# Patient Record
Sex: MALE | Race: WHITE | Employment: FULL TIME | URBAN - METROPOLITAN AREA
[De-identification: names, ages, dates, MRNs, and addresses within clinical notes are randomized per-mention and may not be internally consistent; named-entity substitution may affect disease eponyms.]

---

## 2020-12-04 LAB
ALBUMIN: 4.3 G/DL (ref 3.4–5)
ALP BLD-CCNC: 85 U/L (ref 33–136)
ALT SERPL-CCNC: 27 U/L (ref 10–52)
ANION GAP SERPL CALCULATED.3IONS-SCNC: 18 MMOL/L (ref 10–20)
APPEARANCE: CLEAR
AST SERPL-CCNC: 42 U/L (ref 9–39)
BACTERIA, URINE: ABNORMAL /HPF
BASOPHILS # BLD: 0.04 X10E9/L (ref 0–0.1)
BASOPHILS RELATIVE PERCENT: 0.5 % (ref 0–2)
BICARBONATE: 19 MMOL/L (ref 21–32)
BILIRUB SERPL-MCNC: 0.7 MG/DL (ref 0–1.2)
BILIRUBIN, URINE: NEGATIVE
BLOOD, URINE: ABNORMAL
CALCIUM SERPL-MCNC: 9.3 MG/DL (ref 8.6–10.3)
CHLORIDE BLD-SCNC: 106 MMOL/L (ref 98–107)
COLOR, URINE: ABNORMAL
CREAT SERPL-MCNC: 1.31 MG/DL (ref 0.5–1.3)
EOSINOPHIL # BLD: 0.09 X10E9/L (ref 0–0.7)
EOSINOPHILS RELATIVE PERCENT: 1.2 % (ref 0–6)
ERYTHROCYTE [DISTWIDTH] IN BLOOD BY AUTOMATED COUNT: 13.7 % (ref 11.5–14)
GFR AFRICAN AMERICAN: 67 ML/MIN/1.73M2
GFR NON-AFRICAN AMERICAN: 55 ML/MIN/1.73M2
GLUCOSE, URINE: NEGATIVE MG/DL
GLUCOSE: 134 MG/DL (ref 74–99)
HCT VFR BLD CALC: 45.4 % (ref 41–52)
HEMOGLOBIN: 14.7 G/DL (ref 13.5–17)
HYALINE CASTS: ABNORMAL /LPF
IMMATURE GRANULOCYTES %: 0.1 % (ref 0–0.9)
KETONES, URINE: ABNORMAL MG/DL
LEUKOCYTE ESTERASE, URINE: NEGATIVE
LYMPHOCYTES # BLD: 45.3 % (ref 13–44)
LYMPHOCYTES RELATIVE PERCENT: 3.37 X10E9/L (ref 1.2–4.8)
MCHC RBC AUTO-ENTMCNC: 32.4 G/DL (ref 32–36)
MCV RBC AUTO: 88 FL (ref 80–100)
MONOCYTES # BLD: 0.59 X10E9/L (ref 0.1–1)
MONOCYTES RELATIVE PERCENT: 7.9 % (ref 2–10)
MUCUS, URINE: ABNORMAL /LPF
NEUTROPHILS RELATIVE PERCENT: 45 % (ref 40–80)
NEUTROPHILS: 3.34 X10E9/L (ref 1.2–7.7)
NITRITE, URINE: NEGATIVE
PH UA: 7 (ref 5–8)
PLATELET # BLD: 128 X10E9/L (ref 150–450)
POTASSIUM SERPL-SCNC: 4.2 MMOL/L (ref 3.5–5.3)
POTASSIUM SERPL-SCNC: 4.4 MMOL/L (ref 3.5–5.3)
PROTEIN UA: NEGATIVE MG/DL
RBC # BLD: 5.17 X10E12/L (ref 4.5–5.9)
RBC URINE: 4 /HPF (ref 0–5)
SODIUM BLD-SCNC: 139 MMOL/L (ref 136–145)
SPECIFIC GRAVITY, URINE: 1.01 (ref 1–1)
TOTAL PROTEIN: 7 G/DL (ref 6.4–8.2)
UREA NITROGEN: 15 MG/DL (ref 6–23)
UROBILINOGEN, URINE: <2 MG/DL (ref 0–1.9)
WBC URINE: <1 /HPF (ref 0–5)
WBC: 7.4 X10E9/L (ref 4.4–11.3)

## 2022-10-05 ENCOUNTER — ANESTHESIA (OUTPATIENT)
Dept: OPERATING ROOM | Age: 62
End: 2022-10-05
Payer: COMMERCIAL

## 2022-10-05 ENCOUNTER — APPOINTMENT (OUTPATIENT)
Dept: GENERAL RADIOLOGY | Age: 62
End: 2022-10-05
Attending: ORTHOPAEDIC SURGERY
Payer: COMMERCIAL

## 2022-10-05 ENCOUNTER — ANESTHESIA EVENT (OUTPATIENT)
Dept: OPERATING ROOM | Age: 62
End: 2022-10-05
Payer: COMMERCIAL

## 2022-10-05 ENCOUNTER — HOSPITAL ENCOUNTER (OUTPATIENT)
Age: 62
Setting detail: OUTPATIENT SURGERY
Discharge: HOME | End: 2022-10-05
Attending: ORTHOPAEDIC SURGERY | Admitting: ORTHOPAEDIC SURGERY
Payer: COMMERCIAL

## 2022-10-05 VITALS
OXYGEN SATURATION: 99 % | RESPIRATION RATE: 16 BRPM | SYSTOLIC BLOOD PRESSURE: 148 MMHG | HEART RATE: 66 BPM | TEMPERATURE: 97.5 F | DIASTOLIC BLOOD PRESSURE: 86 MMHG

## 2022-10-05 PROCEDURE — 2580000003 HC RX 258: Performed by: ORTHOPAEDIC SURGERY

## 2022-10-05 PROCEDURE — 6360000002 HC RX W HCPCS: Performed by: ORTHOPAEDIC SURGERY

## 2022-10-05 PROCEDURE — 2709999900 HC NON-CHARGEABLE SUPPLY: Performed by: ORTHOPAEDIC SURGERY

## 2022-10-05 PROCEDURE — 3600000002 HC SURGERY LEVEL 2 BASE: Performed by: ORTHOPAEDIC SURGERY

## 2022-10-05 PROCEDURE — 2500000003 HC RX 250 WO HCPCS: Performed by: ORTHOPAEDIC SURGERY

## 2022-10-05 PROCEDURE — 3700000000 HC ANESTHESIA ATTENDED CARE: Performed by: ORTHOPAEDIC SURGERY

## 2022-10-05 PROCEDURE — 2500000003 HC RX 250 WO HCPCS: Performed by: STUDENT IN AN ORGANIZED HEALTH CARE EDUCATION/TRAINING PROGRAM

## 2022-10-05 PROCEDURE — 3600000012 HC SURGERY LEVEL 2 ADDTL 15MIN: Performed by: ORTHOPAEDIC SURGERY

## 2022-10-05 PROCEDURE — 3209999900 FLUORO FOR SURGICAL PROCEDURES

## 2022-10-05 PROCEDURE — 3700000001 HC ADD 15 MINUTES (ANESTHESIA): Performed by: ORTHOPAEDIC SURGERY

## 2022-10-05 RX ORDER — ONDANSETRON 2 MG/ML
4 INJECTION INTRAMUSCULAR; INTRAVENOUS
Status: DISCONTINUED | OUTPATIENT
Start: 2022-10-05 | End: 2022-10-05 | Stop reason: HOSPADM

## 2022-10-05 RX ORDER — SODIUM CHLORIDE, SODIUM LACTATE, POTASSIUM CHLORIDE, CALCIUM CHLORIDE 600; 310; 30; 20 MG/100ML; MG/100ML; MG/100ML; MG/100ML
INJECTION, SOLUTION INTRAVENOUS CONTINUOUS
Status: DISCONTINUED | OUTPATIENT
Start: 2022-10-05 | End: 2022-10-05 | Stop reason: HOSPADM

## 2022-10-05 RX ORDER — METOPROLOL TARTRATE 100 MG/1
TABLET ORAL
COMMUNITY

## 2022-10-05 RX ORDER — MAGNESIUM HYDROXIDE 1200 MG/15ML
LIQUID ORAL CONTINUOUS PRN
Status: COMPLETED | OUTPATIENT
Start: 2022-10-05 | End: 2022-10-05

## 2022-10-05 RX ORDER — LIDOCAINE HYDROCHLORIDE AND EPINEPHRINE 10; 10 MG/ML; UG/ML
20 INJECTION, SOLUTION INFILTRATION; PERINEURAL
Status: COMPLETED | OUTPATIENT
Start: 2022-10-05 | End: 2022-10-05

## 2022-10-05 RX ORDER — AMLODIPINE BESYLATE 5 MG/1
TABLET ORAL
COMMUNITY

## 2022-10-05 RX ORDER — LIDOCAINE HYDROCHLORIDE 10 MG/ML
1 INJECTION, SOLUTION EPIDURAL; INFILTRATION; INTRACAUDAL; PERINEURAL
Status: COMPLETED | OUTPATIENT
Start: 2022-10-05 | End: 2022-10-05

## 2022-10-05 RX ORDER — LEVOTHYROXINE SODIUM 0.05 MG/1
TABLET ORAL
COMMUNITY

## 2022-10-05 RX ORDER — LIDOCAINE HYDROCHLORIDE 10 MG/ML
1 INJECTION, SOLUTION EPIDURAL; INFILTRATION; INTRACAUDAL; PERINEURAL ONCE
Status: DISCONTINUED | OUTPATIENT
Start: 2022-10-05 | End: 2022-10-05 | Stop reason: HOSPADM

## 2022-10-05 RX ADMIN — LIDOCAINE HYDROCHLORIDE 1 ML: 10 INJECTION, SOLUTION EPIDURAL; INFILTRATION; INTRACAUDAL; PERINEURAL at 08:06

## 2022-10-05 RX ADMIN — CEFAZOLIN 2000 MG: 10 INJECTION, POWDER, FOR SOLUTION INTRAVENOUS at 08:05

## 2022-10-05 RX ADMIN — SODIUM BICARBONATE 50 MEQ: 84 INJECTION, SOLUTION INTRAVENOUS at 08:04

## 2022-10-05 RX ADMIN — SODIUM CHLORIDE, POTASSIUM CHLORIDE, SODIUM LACTATE AND CALCIUM CHLORIDE: 600; 310; 30; 20 INJECTION, SOLUTION INTRAVENOUS at 08:01

## 2022-10-05 RX ADMIN — LIDOCAINE HYDROCHLORIDE,EPINEPHRINE BITARTRATE 20 ML: 10; .01 INJECTION, SOLUTION INFILTRATION; PERINEURAL at 08:02

## 2022-10-05 ASSESSMENT — PAIN SCALES - GENERAL: PAINLEVEL_OUTOF10: 0

## 2022-10-05 NOTE — ANESTHESIA PRE PROCEDURE
Department of Anesthesiology  Preprocedure Note       Name:  Thomas Darling   Age:  57 y.o.  :  1960                                          MRN:  [id number]         Date:  10/5/2022      Surgeon: Kirill Hernandez):  Andrade Thomas DO    Procedure: Procedure(s):  LEFT RING FINGER REVISION OF AMPUTATION WITH POSSIBLE FULL THICKNESS SKIN GRAFT, SUPINE, WIDE AWAKE BLOCK, DIGITAL BLOCK. LOCAL/ MAC,    Medications prior to admission:   Prior to Admission medications    Medication Sig Start Date End Date Taking? Authorizing Provider   amLODIPine (NORVASC) 5 MG tablet Take by mouth    Historical Provider, MD   levothyroxine (SYNTHROID) 50 MCG tablet Take by mouth    Historical Provider, MD   metoprolol (LOPRESSOR) 100 MG tablet Take by mouth    Historical Provider, MD       Current medications:    Current Facility-Administered Medications   Medication Dose Route Frequency Provider Last Rate Last Admin    ceFAZolin (ANCEF) 2000 mg in dextrose 5 % 100 mL IVPB  2,000 mg IntraVENous Once Alexey MONICA Ciaccia, DO        lactated ringers infusion   IntraVENous Continuous Alexey Dysonia, DO        lidocaine PF 1 % injection 1 mL  1 mL IntraDERmal Once Alexey MONICA Ciaccia, DO        lidocaine-EPINEPHrine 1 %-1:269924 injection 20 mL  20 mL IntraDERmal Once PRN Alexey M Ciaccia, DO        sodium bicarbonate 8.4 % injection 50 mEq  50 mEq IntraVENous Once PRN Alexey MONICA Ciaccia, DO           Allergies: Allergies   Allergen Reactions    Valproic Acid Other (See Comments)       Problem List:  There is no problem list on file for this patient. Past Medical History:  No past medical history on file. Past Surgical History:  No past surgical history on file.     Social History:    Social History     Tobacco Use    Smoking status: Not on file    Smokeless tobacco: Not on file   Substance Use Topics    Alcohol use: Not on file                                Counseling given: Not Answered      Vital Signs (Current):   Vitals: 10/05/22 0745   BP: (!) 148/80   Pulse: 69   Temp: (!) 96.1 °F (35.6 °C)   TempSrc: Temporal   SpO2: 96%                                              BP Readings from Last 3 Encounters:   10/05/22 (!) 148/80       NPO Status: Time of last liquid consumption: 2200                        Time of last solid consumption: 2100                        Date of last liquid consumption: 10/04/22                        Date of last solid food consumption: 10/04/22    BMI:   Wt Readings from Last 3 Encounters:   No data found for Wt     There is no height or weight on file to calculate BMI.    CBC:   Lab Results   Component Value Date/Time    WBC 7.4 12/04/2020 08:19 AM    RBC 5.17 12/04/2020 08:19 AM    HGB 14.7 12/04/2020 08:19 AM    HCT 45.4 12/04/2020 08:19 AM    MCV 88 12/04/2020 08:19 AM     12/04/2020 08:19 AM       CMP:   Lab Results   Component Value Date/Time     12/04/2020 08:19 AM    K 4.2 12/04/2020 10:05 AM     12/04/2020 08:19 AM    CREATININE 1.31 12/04/2020 08:19 AM    GFRAA 67 12/04/2020 08:19 AM    LABGLOM 55 12/04/2020 08:19 AM    GLUCOSE 134 12/04/2020 08:19 AM    PROT 7.0 12/04/2020 08:19 AM    CALCIUM 9.3 12/04/2020 08:19 AM    BILITOT 0.7 12/04/2020 08:19 AM    ALKPHOS 85 12/04/2020 08:19 AM    AST 42 12/04/2020 08:19 AM    ALT 27 12/04/2020 08:19 AM       POC Tests: No results for input(s): POCGLU, POCNA, POCK, POCCL, POCBUN, POCHEMO, POCHCT in the last 72 hours.     Coags: No results found for: PROTIME, INR, APTT    HCG (If Applicable): No results found for: PREGTESTUR, PREGSERUM, HCG, HCGQUANT     ABGs: No results found for: PHART, PO2ART, POV2ESD, UYX4AAW, BEART, D0HGNXHD     Type & Screen (If Applicable):  No results found for: LABABO, LABRH    Drug/Infectious Status (If Applicable):  No results found for: HIV, HEPCAB    COVID-19 Screening (If Applicable): No results found for: COVID19        Anesthesia Evaluation  Patient summary reviewed and Nursing notes reviewed no history of anesthetic complications:   Airway: Mallampati: II  TM distance: >3 FB   Neck ROM: full  Mouth opening: > = 3 FB   Dental: normal exam         Pulmonary:Negative Pulmonary ROS and normal exam                               Cardiovascular:  Exercise tolerance: good (>4 METS),   (+) hypertension:,          Beta Blocker:  Not on Beta Blocker         Neuro/Psych:   Negative Neuro/Psych ROS              GI/Hepatic/Renal: Neg GI/Hepatic/Renal ROS            Endo/Other:    (+) hypothyroidism::., .          Pt had PAT visit. Abdominal:             Vascular: negative vascular ROS. Other Findings:           Anesthesia Plan      MAC     ASA 2       Induction: intravenous. Anesthetic plan and risks discussed with patient. Plan discussed with CRNA.     Attending anesthesiologist reviewed and agrees with Preprocedure content                [facility],    10/5/2022

## 2022-10-05 NOTE — OP NOTE
Operative Note      Patient: Stewart Maxwell  YOB: 1960  MRN: [id number]    Date of Procedure: 10/5/2022        Preoperative diagnosis: Open fracture left ring finger distal phalanx with nailbed laceration and dorsal soft tissue loss at the tip of the digit    Postoperative diagnosis: Same     Procedure planned: Excisional debridement to open fracture of left ring finger with possible full-thickness skin grafting with possible revision of amputation    Procedure performed: Excisional debridement to open fracture of left ring finger distal phalanx. Open treatment left ring finger distal phalanx fracture. Nailbed repair left ring finger using local tissue rearrangement with rotational flap of remaining nailbed tissue. Surgeon: Oralia Strickland D.O. Assistant: None    Anesthesia: Digital block monitored by the anesthesia team    Estimated blood loss: Less than 10 cc    Drains: None    Tourniquet: Turnicot for approximately 30 minutes    Specimens: None    Implants: None    Indications: The patient sustained complex injury to the left ring finger in the workplace. His finger was injured with a . There was extensive soft tissue loss across the dorsal distal aspect of the left ring finger. There was evidence of open fracture as well. Treatment options were discussed including operative and nonoperative strategies. Recommendations made for excisional debridement with possible revision of amputation with possible full-thickness skin grafting with possible primary closure. Informed consent was signed and placed in the chart. Complications: None noted at the time of surgery     Description of operation: The patient was taken to the operative suite and placed in the supine position on the operating table. A timeout was performed and the left ring finger confirmed to be the operative site.   The patient was carefully positioned on the table in such a fashion as to pad all bony prominences and peripheral nerves administered appropriate IV antibiotics. The digital block was working well. He was prepped and draped in normal sterile fashion. A turnicot was placed to the base of the left ring finger. The wound was inspected. There was full-thickness nailbed tissue loss about the midline of the digit exposing approximately one third of the dorsal aspect of the distal phalanx. The distal phalanx showed evidence for open fracture both at the tuft along with a split about the distal ulnar aspect of the shaft segment. It was felt that there was enough soft tissue available to perform primary closure. The open fracture was debrided removing elements of bony particulate with rongeur and curette tenotomy scissors were used to debride nonviable skin edges and subcutaneous tissue along with nonviable nailbed tissue. 3 L of normal sterile saline were then passed through the wound. In order to achieve closure of the open fracture was treated by way of removal of the bony elements that effectively decrease the transverse dimension of the digit. This allowed the more ulnar soft tissues to be advanced to approximate the remaining nailbed. This could not be done however without rotational flap involving the nailbed. The 15 blade was used to elevate the nailbed off of the midline and radial aspects of its attachment. This was then rotated ulnarly to allow for good approximation to the ulnar soft tissues and excellent coverage of the remaining bone stock of the distal phalanx. This rotational flap and nailbed repair was secured with chromic suture. The skin laceration was repaired with chromic suture and nylon. Additional irrigation was performed. The turnicot was relieved and viability and hemostasis confirmed. Soft dressing was placed and the patient was allowed to head to recovery in stable condition. Overall he tolerated the procedure well.      Disposition: Stable to PACU     Electronically signed by Allyson Riley DO on 10/5/2022 at 10:08 AM

## 2023-03-13 PROBLEM — Z85.9 HISTORY OF CANCER: Status: ACTIVE | Noted: 2023-03-13

## 2023-03-13 PROBLEM — R79.9 ABNORMAL BLOOD CHEMISTRY: Status: ACTIVE | Noted: 2023-03-13

## 2023-03-13 PROBLEM — R93.1 ELEVATED CORONARY ARTERY CALCIUM SCORE: Status: ACTIVE | Noted: 2023-03-13

## 2023-03-13 PROBLEM — R10.2 PELVIC PAIN IN MALE: Status: ACTIVE | Noted: 2023-03-13

## 2023-03-13 PROBLEM — M13.0 POLYARTHRITIS: Status: ACTIVE | Noted: 2023-03-13

## 2023-03-13 PROBLEM — S69.90XA FINGER INJURY: Status: ACTIVE | Noted: 2023-03-13

## 2023-03-13 PROBLEM — M17.9 OSTEOARTHROSIS OF KNEE: Status: ACTIVE | Noted: 2023-03-13

## 2023-03-13 PROBLEM — K21.00 GASTROESOPHAGEAL REFLUX DISEASE WITH ESOPHAGITIS: Status: ACTIVE | Noted: 2023-03-13

## 2023-03-13 PROBLEM — I83.92 VARICOSE VEINS OF LEFT LOWER EXTREMITY: Status: ACTIVE | Noted: 2023-03-13

## 2023-03-13 PROBLEM — M54.9 BACK PAIN: Status: ACTIVE | Noted: 2023-03-13

## 2023-03-13 PROBLEM — K57.32 DIVERTICULITIS OF COLON: Status: ACTIVE | Noted: 2023-03-13

## 2023-03-13 PROBLEM — R35.89 POLYURIA: Status: ACTIVE | Noted: 2023-03-13

## 2023-03-13 PROBLEM — M54.16 LUMBAR RADICULOPATHY: Status: ACTIVE | Noted: 2023-03-13

## 2023-03-13 PROBLEM — G62.9 NEUROPATHY: Status: ACTIVE | Noted: 2023-03-13

## 2023-03-13 PROBLEM — J02.9 SORE THROAT: Status: ACTIVE | Noted: 2023-03-13

## 2023-03-13 PROBLEM — E03.9 HYPOTHYROIDISM: Status: ACTIVE | Noted: 2023-03-13

## 2023-03-13 PROBLEM — R79.89 ELEVATED TSH: Status: ACTIVE | Noted: 2023-03-13

## 2023-03-13 PROBLEM — G43.909 MIGRAINE, UNSPECIFIED, NOT INTRACTABLE, WITHOUT STATUS MIGRAINOSUS: Status: ACTIVE | Noted: 2023-03-13

## 2023-03-13 PROBLEM — I10 HYPERTENSION: Status: ACTIVE | Noted: 2023-03-13

## 2023-03-13 PROBLEM — E78.5 HYPERLIPIDEMIA: Status: ACTIVE | Noted: 2023-03-13

## 2023-03-13 PROBLEM — M99.9 NONALLOPATHIC LESION OF HIP REGION: Status: ACTIVE | Noted: 2023-03-13

## 2023-03-13 RX ORDER — HYDROCODONE BITARTRATE AND ACETAMINOPHEN 5; 325 MG/1; MG/1
1 TABLET ORAL EVERY 8 HOURS PRN
COMMUNITY
Start: 2022-09-30 | End: 2023-03-16 | Stop reason: ALTCHOICE

## 2023-03-13 RX ORDER — LEVOTHYROXINE SODIUM 50 UG/1
1 TABLET ORAL DAILY
COMMUNITY
Start: 2019-04-16 | End: 2023-03-16 | Stop reason: SDUPTHER

## 2023-03-13 RX ORDER — METOPROLOL TARTRATE 100 MG/1
1 TABLET ORAL DAILY
COMMUNITY
Start: 2018-09-17 | End: 2023-03-16 | Stop reason: SDUPTHER

## 2023-03-13 RX ORDER — AMLODIPINE BESYLATE 5 MG/1
1 TABLET ORAL DAILY
COMMUNITY
Start: 2018-08-30 | End: 2023-03-16 | Stop reason: SDUPTHER

## 2023-03-16 ENCOUNTER — OFFICE VISIT (OUTPATIENT)
Dept: PRIMARY CARE | Facility: CLINIC | Age: 63
End: 2023-03-16
Payer: COMMERCIAL

## 2023-03-16 ENCOUNTER — LAB (OUTPATIENT)
Dept: LAB | Facility: LAB | Age: 63
End: 2023-03-16
Payer: COMMERCIAL

## 2023-03-16 VITALS
HEIGHT: 70 IN | DIASTOLIC BLOOD PRESSURE: 94 MMHG | WEIGHT: 245 LBS | BODY MASS INDEX: 35.07 KG/M2 | SYSTOLIC BLOOD PRESSURE: 138 MMHG

## 2023-03-16 DIAGNOSIS — E03.8 HYPOTHYROIDISM DUE TO HASHIMOTO'S THYROIDITIS: ICD-10-CM

## 2023-03-16 DIAGNOSIS — E06.3 HYPOTHYROIDISM DUE TO HASHIMOTO'S THYROIDITIS: ICD-10-CM

## 2023-03-16 DIAGNOSIS — I10 PRIMARY HYPERTENSION: ICD-10-CM

## 2023-03-16 DIAGNOSIS — I10 PRIMARY HYPERTENSION: Primary | ICD-10-CM

## 2023-03-16 LAB
ALANINE AMINOTRANSFERASE (SGPT) (U/L) IN SER/PLAS: 24 U/L (ref 10–52)
ALBUMIN (G/DL) IN SER/PLAS: 4.2 G/DL (ref 3.4–5)
ALKALINE PHOSPHATASE (U/L) IN SER/PLAS: 102 U/L (ref 33–136)
ANION GAP IN SER/PLAS: 15 MMOL/L (ref 10–20)
ASPARTATE AMINOTRANSFERASE (SGOT) (U/L) IN SER/PLAS: 25 U/L (ref 9–39)
BILIRUBIN TOTAL (MG/DL) IN SER/PLAS: 0.5 MG/DL (ref 0–1.2)
CALCIUM (MG/DL) IN SER/PLAS: 9.4 MG/DL (ref 8.6–10.3)
CARBON DIOXIDE, TOTAL (MMOL/L) IN SER/PLAS: 26 MMOL/L (ref 21–32)
CHLORIDE (MMOL/L) IN SER/PLAS: 108 MMOL/L (ref 98–107)
CHOLESTEROL (MG/DL) IN SER/PLAS: 148 MG/DL (ref 0–199)
CHOLESTEROL IN HDL (MG/DL) IN SER/PLAS: 44.4 MG/DL
CHOLESTEROL/HDL RATIO: 3.3
CREATININE (MG/DL) IN SER/PLAS: 1.29 MG/DL (ref 0.5–1.3)
GFR MALE: 62 ML/MIN/1.73M2
GLUCOSE (MG/DL) IN SER/PLAS: 91 MG/DL (ref 74–99)
LDL: 74 MG/DL (ref 0–99)
POTASSIUM (MMOL/L) IN SER/PLAS: 5 MMOL/L (ref 3.5–5.3)
PROTEIN TOTAL: 7 G/DL (ref 6.4–8.2)
SODIUM (MMOL/L) IN SER/PLAS: 144 MMOL/L (ref 136–145)
THYROTROPIN (MIU/L) IN SER/PLAS BY DETECTION LIMIT <= 0.05 MIU/L: 2.47 MIU/L (ref 0.44–3.98)
TRIGLYCERIDE (MG/DL) IN SER/PLAS: 149 MG/DL (ref 0–149)
UREA NITROGEN (MG/DL) IN SER/PLAS: 28 MG/DL (ref 6–23)
VLDL: 30 MG/DL (ref 0–40)

## 2023-03-16 PROCEDURE — 80053 COMPREHEN METABOLIC PANEL: CPT

## 2023-03-16 PROCEDURE — 3075F SYST BP GE 130 - 139MM HG: CPT | Performed by: FAMILY MEDICINE

## 2023-03-16 PROCEDURE — 84443 ASSAY THYROID STIM HORMONE: CPT

## 2023-03-16 PROCEDURE — 3080F DIAST BP >= 90 MM HG: CPT | Performed by: FAMILY MEDICINE

## 2023-03-16 PROCEDURE — 80061 LIPID PANEL: CPT

## 2023-03-16 PROCEDURE — 99213 OFFICE O/P EST LOW 20 MIN: CPT | Performed by: FAMILY MEDICINE

## 2023-03-16 PROCEDURE — 36415 COLL VENOUS BLD VENIPUNCTURE: CPT

## 2023-03-16 RX ORDER — AMLODIPINE BESYLATE 5 MG/1
5 TABLET ORAL DAILY
Qty: 90 TABLET | Refills: 3 | Status: SHIPPED | OUTPATIENT
Start: 2023-03-16 | End: 2023-03-23 | Stop reason: SDUPTHER

## 2023-03-16 RX ORDER — LEVOTHYROXINE SODIUM 50 UG/1
50 TABLET ORAL DAILY
Qty: 90 TABLET | Refills: 3 | Status: SHIPPED | OUTPATIENT
Start: 2023-03-16 | End: 2023-03-23 | Stop reason: SDUPTHER

## 2023-03-16 RX ORDER — METOPROLOL TARTRATE 100 MG/1
100 TABLET ORAL DAILY
Qty: 90 TABLET | Refills: 3 | Status: SHIPPED | OUTPATIENT
Start: 2023-03-16 | End: 2023-03-23 | Stop reason: SDUPTHER

## 2023-03-16 ASSESSMENT — ENCOUNTER SYMPTOMS
ALLERGIC/IMMUNOLOGIC NEGATIVE: 1
EYES NEGATIVE: 1
ENDOCRINE NEGATIVE: 1
PALPITATIONS: 0
NECK PAIN: 0
HEADACHES: 0
NEUROLOGICAL NEGATIVE: 1
RESPIRATORY NEGATIVE: 1
CONSTITUTIONAL NEGATIVE: 1
PND: 0
HEMATOLOGIC/LYMPHATIC NEGATIVE: 1
ORTHOPNEA: 0
PSYCHIATRIC NEGATIVE: 1
CARDIOVASCULAR NEGATIVE: 1
SWEATS: 0
ARTHRALGIAS: 1
BLURRED VISION: 0
HYPERTENSION: 1
GASTROINTESTINAL NEGATIVE: 1
SHORTNESS OF BREATH: 0

## 2023-03-16 NOTE — PATIENT INSTRUCTIONS
Rx . Labs contd meds , diet , tcb x 1wk , had all shots , FUWOD's start taking amlodopine ,  monitor BP daily ,

## 2023-03-16 NOTE — PROGRESS NOTES
"Subjective   Patient ID: Derick Barone is a 62 y.o. male who presents for Follow-up (Patient presented today for medication management. ).    Hypertension  This is a chronic problem. The problem is unchanged. Pertinent negatives include no anxiety, blurred vision, chest pain, headaches, malaise/fatigue, neck pain, orthopnea, palpitations, peripheral edema, PND, shortness of breath or sweats. There are no associated agents to hypertension. Risk factors for coronary artery disease include male gender and family history. Past treatments include calcium channel blockers. Compliance problems include exercise and diet.  There is no history of angina, kidney disease, CAD/MI, CVA, heart failure, left ventricular hypertrophy, PVD or retinopathy. There is no history of chronic renal disease, coarctation of the aorta, hyperaldosteronism, hypercortisolism, hyperparathyroidism, a hypertension causing med, pheochromocytoma, renovascular disease, sleep apnea or a thyroid problem.        Review of Systems   Constitutional: Negative.  Negative for malaise/fatigue.   HENT: Negative.     Eyes: Negative.  Negative for blurred vision.   Respiratory: Negative.  Negative for shortness of breath.    Cardiovascular: Negative.  Negative for chest pain, palpitations, orthopnea and PND.   Gastrointestinal: Negative.    Endocrine: Negative.    Genitourinary: Negative.    Musculoskeletal:  Positive for arthralgias. Negative for neck pain.   Skin: Negative.    Allergic/Immunologic: Negative.    Neurological: Negative.  Negative for headaches.   Hematological: Negative.    Psychiatric/Behavioral: Negative.         Objective   BP (!) 138/94   Ht 1.778 m (5' 10\")   Wt 111 kg (245 lb)   BMI 35.15 kg/m²     Physical Exam  Constitutional:       Appearance: Normal appearance.   HENT:      Head: Normocephalic and atraumatic.      Nose: Nose normal.      Mouth/Throat:      Mouth: Mucous membranes are moist.   Eyes:      Extraocular Movements: " Extraocular movements intact.      Pupils: Pupils are equal, round, and reactive to light.   Cardiovascular:      Rate and Rhythm: Normal rate and regular rhythm.   Pulmonary:      Effort: Pulmonary effort is normal.   Musculoskeletal:         General: Deformity: Lt 4/5 finger tips.      Cervical back: Normal range of motion.   Skin:     General: Skin is warm.   Neurological:      General: No focal deficit present.      Mental Status: He is alert and oriented to person, place, and time.   Psychiatric:         Mood and Affect: Mood normal.         Behavior: Behavior normal.         Assessment/Plan

## 2023-03-23 DIAGNOSIS — E06.3 HYPOTHYROIDISM DUE TO HASHIMOTO'S THYROIDITIS: ICD-10-CM

## 2023-03-23 DIAGNOSIS — E03.8 HYPOTHYROIDISM DUE TO HASHIMOTO'S THYROIDITIS: ICD-10-CM

## 2023-03-23 DIAGNOSIS — I10 PRIMARY HYPERTENSION: ICD-10-CM

## 2023-03-23 RX ORDER — LEVOTHYROXINE SODIUM 50 UG/1
50 TABLET ORAL DAILY
Qty: 90 TABLET | Refills: 3 | Status: SHIPPED | OUTPATIENT
Start: 2023-03-23 | End: 2023-12-11 | Stop reason: SDUPTHER

## 2023-03-23 RX ORDER — AMLODIPINE BESYLATE 5 MG/1
5 TABLET ORAL DAILY
Qty: 90 TABLET | Refills: 3 | Status: SHIPPED | OUTPATIENT
Start: 2023-03-23 | End: 2023-12-11 | Stop reason: WASHOUT

## 2023-03-23 RX ORDER — METOPROLOL TARTRATE 100 MG/1
100 TABLET ORAL DAILY
Qty: 90 TABLET | Refills: 3 | Status: SHIPPED | OUTPATIENT
Start: 2023-03-23 | End: 2023-05-24 | Stop reason: WASHOUT

## 2023-04-21 ENCOUNTER — TELEPHONE (OUTPATIENT)
Dept: PRIMARY CARE | Facility: CLINIC | Age: 63
End: 2023-04-21

## 2023-04-21 ENCOUNTER — OFFICE VISIT (OUTPATIENT)
Dept: PRIMARY CARE | Facility: CLINIC | Age: 63
End: 2023-04-21
Payer: COMMERCIAL

## 2023-04-21 VITALS
OXYGEN SATURATION: 96 % | DIASTOLIC BLOOD PRESSURE: 80 MMHG | WEIGHT: 249.4 LBS | SYSTOLIC BLOOD PRESSURE: 130 MMHG | HEART RATE: 75 BPM | HEIGHT: 70 IN | BODY MASS INDEX: 35.71 KG/M2

## 2023-04-21 DIAGNOSIS — M46.28 SACRAL OSTEOMYELITIS (MULTI): ICD-10-CM

## 2023-04-21 DIAGNOSIS — M54.10 NERVE ROOT INFLAMMATION: ICD-10-CM

## 2023-04-21 DIAGNOSIS — T14.8XXA MUSCLE STRAIN: ICD-10-CM

## 2023-04-21 DIAGNOSIS — Z76.89 ENCOUNTER FOR SUPPORT AND COORDINATION OF TRANSITION OF CARE: Primary | ICD-10-CM

## 2023-04-21 PROCEDURE — 3075F SYST BP GE 130 - 139MM HG: CPT | Performed by: EMERGENCY MEDICINE

## 2023-04-21 PROCEDURE — 3079F DIAST BP 80-89 MM HG: CPT | Performed by: EMERGENCY MEDICINE

## 2023-04-21 PROCEDURE — 99496 TRANSJ CARE MGMT HIGH F2F 7D: CPT | Performed by: EMERGENCY MEDICINE

## 2023-04-21 PROCEDURE — 1036F TOBACCO NON-USER: CPT | Performed by: EMERGENCY MEDICINE

## 2023-04-21 RX ORDER — CEFAZOLIN 2 G/1
2 INJECTION, POWDER, FOR SOLUTION INTRAMUSCULAR; INTRAVENOUS
COMMUNITY
Start: 2023-04-17 | End: 2023-05-24 | Stop reason: WASHOUT

## 2023-04-21 RX ORDER — CYCLOBENZAPRINE HCL 10 MG
10 TABLET ORAL NIGHTLY PRN
Qty: 7 TABLET | Refills: 0 | Status: SHIPPED | OUTPATIENT
Start: 2023-04-21 | End: 2023-05-24 | Stop reason: WASHOUT

## 2023-04-21 RX ORDER — PREDNISONE 20 MG/1
20 TABLET ORAL DAILY
Qty: 10 TABLET | Refills: 0 | Status: SHIPPED | OUTPATIENT
Start: 2023-04-21 | End: 2023-04-24 | Stop reason: SDUPTHER

## 2023-04-21 ASSESSMENT — PATIENT HEALTH QUESTIONNAIRE - PHQ9
2. FEELING DOWN, DEPRESSED OR HOPELESS: NOT AT ALL
SUM OF ALL RESPONSES TO PHQ9 QUESTIONS 1 AND 2: 0
1. LITTLE INTEREST OR PLEASURE IN DOING THINGS: NOT AT ALL

## 2023-04-21 NOTE — TELEPHONE ENCOUNTER
Prednisone was called in with two different directions. After his visit    Directions state:     Take 1 tablet (20 mg) by mouth once daily for 5 days. TWO PILLS DAILY    Do you want him to take 2 pills daily or 1 pill daily?

## 2023-04-21 NOTE — PROGRESS NOTES
Subjective   Patient ID: Derick Barone is a 62 y.o. male who presents for Follow-up (Follow up from . Was admitted for 7 days for sepsis. ).    Assessment/Plan   Problem List Items Addressed This Visit    None    62-year-old for transition of care    Iliopsoas infection with possible sacral osteomyelitis-patient has a PICC line and is on IV cefazolin.   will follow-up with ID    Joint pains and other aches-I will treat him with a short course of prednisone and Flexeril    Hypertension-continue metoprolol and amlodipine    Hypothyroidism-continue Synthroid    Patient will follow-up in 1 to 2 months after completing IV antibiotics for a physical and for us to address his long-term health issues    Source of history: Nurse, Medical personnel, Medical record, Patient.  History limitation: None.    HPI  Patient was discharged from [facility] on April 17 and there was interactive contact by patient/family or facility staff on behalf of patient with me/office within 2 working days regarding patient's transition     Patient was recently admitted to the hospital.  Patient's history regarding the reason for hospital admission and the course in the hospital was reviewed with patinet and/or family.  Patient's medical records including lab work, radiology and other medical notes were reviewed.  His medication list prior to admission and discharge medication list are compared and updated.    Patient encounter was done face-to-face  Patient is unable to give detailed history and therefore history is obtained from the chart  History from hospitalization-  62-year-old admitted with groin pain  Questionable iliopsoas abscess  Later on thought to be a phlegmon treated with IV antibiotics  Bone scan showed questionable uptake in sacrum suggestive of infection  Orthopedics and infectious disease consultations were obtained  ID recommended cefazolin for 5 weeks which patient is currently taking via PICC  "line    Currently states that he still has a lot of pain all over.  Prednisone on the last day helped him in the hospital  Allergies   Allergen Reactions    Divalproex Other    Valproic Acid Unknown       Current Outpatient Medications   Medication Sig Dispense Refill    amLODIPine (Norvasc) 5 mg tablet Take 1 tablet (5 mg) by mouth once daily. (Patient taking differently: Take 2 tablets (10 mg) by mouth once daily.) 90 tablet 3    ceFAZolin 2 gram recon soln 2 g. Every 8 hrs      levothyroxine (Synthroid, Levoxyl) 50 mcg tablet Take 1 tablet (50 mcg) by mouth once daily. 90 tablet 3    metoprolol tartrate (Lopressor) 100 mg tablet Take 1 tablet (100 mg) by mouth once daily. 90 tablet 3     No current facility-administered medications for this visit.       Objective   Visit Vitals  /80   Pulse 75   Ht 1.778 m (5' 10\")   Wt 113 kg (249 lb 6.4 oz)   SpO2 96%   BMI 35.79 kg/m²   Smoking Status Former   BSA 2.36 m²     Physical Exam  Vital signs as per nursing/MA documentation  General appearance: Alert and in no acute distress  HEENT: Normal Inspection  Neck - Normal Inspection  Respiratory : No respiratory distress. Lungs are clear   Cardiovascular: heart rate normal. No gallop  Back - normal inspection  Skin inspection:Warm  Musculoskeletal : No deformities  Neuro : Limited exam. Baseline    Review of Systems   Comprehensive review of systems as allowed by patient condition and nursing input is negative    Results including lab work, imaging reports were reviewed and wherever possible, independently verified    Family History   Problem Relation Name Age of Onset    Diabetes Mother      Coronary artery disease Father      Leukemia Sister      Diabetes Sister      Epilepsy Daughter      Seizures Daughter      Colon cancer Mother's Brother      Pancreatic cancer Mother's Brother      Throat cancer Mother's Brother      Coronary artery disease Mother's Brother       Social History     Socioeconomic History    " Marital status:      Spouse name: None    Number of children: None    Years of education: None    Highest education level: None   Occupational History    None   Tobacco Use    Smoking status: Former     Types: Cigarettes    Smokeless tobacco: Never   Vaping Use    Vaping status: None   Substance and Sexual Activity    Alcohol use: Never    Drug use: None    Sexual activity: None   Other Topics Concern    None   Social History Narrative    None     Social Determinants of Health     Financial Resource Strain: Not on file   Food Insecurity: Not on file   Transportation Needs: Not on file   Physical Activity: Not on file   Stress: Not on file   Social Connections: Not on file   Intimate Partner Violence: Not on file   Housing Stability: Not on file     Past Medical History:   Diagnosis Date    Acute ethmoidal sinusitis, unspecified     Acute ethmoidal sinusitis     Past Surgical History:   Procedure Laterality Date    OTHER SURGICAL HISTORY  03/20/2019    Inguinal hernia repair    OTHER SURGICAL HISTORY  12/21/2020    Knee replacement       Charting was completed using voice recognition technology and may include unintended errors.

## 2023-04-24 DIAGNOSIS — M54.10 NERVE ROOT INFLAMMATION: ICD-10-CM

## 2023-04-24 RX ORDER — PREDNISONE 20 MG/1
TABLET ORAL
Qty: 10 TABLET | Refills: 0 | Status: SHIPPED | OUTPATIENT
Start: 2023-04-24 | End: 2023-04-24 | Stop reason: SDUPTHER

## 2023-04-24 RX ORDER — PREDNISONE 20 MG/1
TABLET ORAL
Qty: 10 TABLET | Refills: 0 | Status: SHIPPED | OUTPATIENT
Start: 2023-04-24 | End: 2023-05-24 | Stop reason: WASHOUT

## 2023-05-03 ENCOUNTER — OFFICE VISIT (OUTPATIENT)
Dept: PRIMARY CARE | Facility: CLINIC | Age: 63
End: 2023-05-03
Payer: COMMERCIAL

## 2023-05-03 VITALS — BODY MASS INDEX: 35.3 KG/M2 | SYSTOLIC BLOOD PRESSURE: 144 MMHG | DIASTOLIC BLOOD PRESSURE: 86 MMHG | WEIGHT: 246 LBS

## 2023-05-03 DIAGNOSIS — E06.3 HYPOTHYROIDISM DUE TO HASHIMOTO'S THYROIDITIS: ICD-10-CM

## 2023-05-03 DIAGNOSIS — E66.01 CLASS 2 SEVERE OBESITY DUE TO EXCESS CALORIES WITH SERIOUS COMORBIDITY IN ADULT, UNSPECIFIED BMI (MULTI): ICD-10-CM

## 2023-05-03 DIAGNOSIS — I10 PRIMARY HYPERTENSION: ICD-10-CM

## 2023-05-03 DIAGNOSIS — M54.16 LUMBAR RADICULOPATHY: Primary | ICD-10-CM

## 2023-05-03 DIAGNOSIS — M25.562 CHRONIC PAIN OF LEFT KNEE: ICD-10-CM

## 2023-05-03 DIAGNOSIS — M17.32 POST-TRAUMATIC OSTEOARTHRITIS OF LEFT KNEE: ICD-10-CM

## 2023-05-03 DIAGNOSIS — N20.0 NEPHROLITHIASIS: ICD-10-CM

## 2023-05-03 DIAGNOSIS — G89.29 CHRONIC PAIN OF LEFT KNEE: ICD-10-CM

## 2023-05-03 DIAGNOSIS — E03.8 HYPOTHYROIDISM DUE TO HASHIMOTO'S THYROIDITIS: ICD-10-CM

## 2023-05-03 DIAGNOSIS — K21.00 GASTROESOPHAGEAL REFLUX DISEASE WITH ESOPHAGITIS WITHOUT HEMORRHAGE: ICD-10-CM

## 2023-05-03 PROBLEM — M25.552 CHRONIC LEFT HIP PAIN: Status: ACTIVE | Noted: 2023-04-12

## 2023-05-03 PROBLEM — N13.30 HYDRONEPHROSIS OF RIGHT KIDNEY: Status: ACTIVE | Noted: 2020-12-15

## 2023-05-03 PROBLEM — N20.1 URETERAL CALCULUS: Status: ACTIVE | Noted: 2020-12-15

## 2023-05-03 PROBLEM — M16.12 OSTEOARTHRITIS OF LEFT HIP: Status: ACTIVE | Noted: 2023-04-12

## 2023-05-03 PROBLEM — E87.6 ACUTE HYPOKALEMIA: Status: ACTIVE | Noted: 2023-04-12

## 2023-05-03 PROBLEM — M25.462 EFFUSION OF KNEE JOINT, LEFT: Status: ACTIVE | Noted: 2023-04-12

## 2023-05-03 PROBLEM — N20.9 URIC ACID UROLITHIASIS: Status: ACTIVE | Noted: 2022-06-20

## 2023-05-03 PROBLEM — N31.8 FREQUENCY-URGENCY SYNDROME: Status: ACTIVE | Noted: 2020-12-15

## 2023-05-03 PROBLEM — I83.10 VARICOSE VEINS OF LOWER EXTREMITY WITH INFLAMMATION: Status: ACTIVE | Noted: 2023-05-03

## 2023-05-03 PROBLEM — R10.9 RIGHT FLANK PAIN: Status: ACTIVE | Noted: 2020-12-15

## 2023-05-03 PROBLEM — R26.89 INABILITY TO BEAR WEIGHT: Status: ACTIVE | Noted: 2023-04-12

## 2023-05-03 PROBLEM — M62.838 CERVICAL PARASPINAL MUSCLE SPASM: Status: ACTIVE | Noted: 2023-04-12

## 2023-05-03 PROBLEM — E66.9 OBESITY, CLASS II, BMI 35-39.9: Status: ACTIVE | Noted: 2022-05-13

## 2023-05-03 PROBLEM — K21.9 GASTROESOPHAGEAL REFLUX DISEASE: Status: ACTIVE | Noted: 2022-06-20

## 2023-05-03 PROBLEM — R42 DIZZINESS: Status: ACTIVE | Noted: 2022-05-13

## 2023-05-03 PROBLEM — E66.812 OBESITY, CLASS II, BMI 35-39.9: Status: ACTIVE | Noted: 2022-05-13

## 2023-05-03 PROBLEM — E66.9 OBESITY: Status: ACTIVE | Noted: 2022-06-20

## 2023-05-03 PROBLEM — E07.9 THYROID DISEASE: Status: ACTIVE | Noted: 2022-05-13

## 2023-05-03 PROCEDURE — 99214 OFFICE O/P EST MOD 30 MIN: CPT | Performed by: FAMILY MEDICINE

## 2023-05-03 PROCEDURE — 3079F DIAST BP 80-89 MM HG: CPT | Performed by: FAMILY MEDICINE

## 2023-05-03 PROCEDURE — 3077F SYST BP >= 140 MM HG: CPT | Performed by: FAMILY MEDICINE

## 2023-05-03 PROCEDURE — 1036F TOBACCO NON-USER: CPT | Performed by: FAMILY MEDICINE

## 2023-05-03 RX ORDER — METOPROLOL TARTRATE 100 MG/1
100 TABLET ORAL 2 TIMES DAILY
COMMUNITY
End: 2023-05-24 | Stop reason: WASHOUT

## 2023-05-03 RX ORDER — ONDANSETRON 4 MG/1
4 TABLET, ORALLY DISINTEGRATING ORAL EVERY 6 HOURS PRN
COMMUNITY
Start: 2023-04-10 | End: 2023-05-24 | Stop reason: WASHOUT

## 2023-05-03 RX ORDER — AMLODIPINE BESYLATE 10 MG
10 TABLET ORAL DAILY
COMMUNITY
Start: 2023-04-18 | End: 2023-12-11 | Stop reason: SDUPTHER

## 2023-05-03 RX ORDER — METOPROLOL SUCCINATE 100 MG/1
100 TABLET, EXTENDED RELEASE ORAL
COMMUNITY
Start: 2023-04-12 | End: 2023-12-11 | Stop reason: SDUPTHER

## 2023-05-03 RX ORDER — OXYCODONE HYDROCHLORIDE 5 MG/1
TABLET ORAL
COMMUNITY
Start: 2023-04-10 | End: 2023-05-24 | Stop reason: WASHOUT

## 2023-05-03 RX ORDER — LEVOTHYROXINE SODIUM 50 UG/1
50 TABLET ORAL
COMMUNITY
End: 2023-12-11 | Stop reason: WASHOUT

## 2023-05-03 ASSESSMENT — ENCOUNTER SYMPTOMS
ACTIVITY CHANGE: 1
FATIGUE: 1
EYES NEGATIVE: 1
PSYCHIATRIC NEGATIVE: 1
APPETITE CHANGE: 1
SHORTNESS OF BREATH: 1
FREQUENCY: 1
CARDIOVASCULAR NEGATIVE: 1
DIARRHEA: 1
NAUSEA: 1
BACK PAIN: 1
ALLERGIC/IMMUNOLOGIC NEGATIVE: 1
HEMATOLOGIC/LYMPHATIC NEGATIVE: 1
ENDOCRINE NEGATIVE: 1

## 2023-05-03 NOTE — PROGRESS NOTES
Subjective   Patient ID: Derick Barone is a 62 y.o. male who presents for Hospital Follow-up ([facility] follow up ).    Hospital follow up         Review of Systems   Constitutional:  Positive for activity change, appetite change and fatigue.   HENT: Negative.     Eyes: Negative.    Respiratory:  Positive for shortness of breath.    Cardiovascular: Negative.    Gastrointestinal:  Positive for diarrhea and nausea.   Endocrine: Negative.    Genitourinary:  Positive for frequency.   Musculoskeletal:  Positive for back pain and gait problem. Arthralgias: rt hip / lt knee.  Skin: Negative.    Allergic/Immunologic: Negative.    Hematological: Negative.    Psychiatric/Behavioral: Negative.         Objective   /86 (BP Location: Right arm, Patient Position: Sitting)   Wt 112 kg (246 lb)   BMI 35.30 kg/m²     Physical Exam  Constitutional:       Appearance: He is obese.   HENT:      Head: Normocephalic and atraumatic.      Nose: Nose normal.   Eyes:      Pupils: Pupils are equal, round, and reactive to light.   Cardiovascular:      Rate and Rhythm: Normal rate and regular rhythm.   Pulmonary:      Effort: Pulmonary effort is normal.   Abdominal:      Palpations: Abdomen is soft.   Musculoskeletal:         General: Tenderness and signs of injury present. No deformity. Swelling: Lt knee , rt hip.     Cervical back: Normal range of motion.      Right lower leg: No edema.      Left lower leg: No edema.   Skin:     General: Skin is warm.   Neurological:      General: No focal deficit present.      Mental Status: He is alert and oriented to person, place, and time.   Psychiatric:         Mood and Affect: Mood normal.         Behavior: Behavior normal.         Assessment/Plan

## 2023-05-03 NOTE — PATIENT INSTRUCTIONS
Get discharge papers from [facility], sami meds , diet ,daily x's weight reduction , FUWOD;s  HHC , tcb x 1wk , rto x 1m

## 2023-05-24 ENCOUNTER — OFFICE VISIT (OUTPATIENT)
Dept: PRIMARY CARE | Facility: CLINIC | Age: 63
End: 2023-05-24
Payer: COMMERCIAL

## 2023-05-24 VITALS
SYSTOLIC BLOOD PRESSURE: 138 MMHG | OXYGEN SATURATION: 97 % | WEIGHT: 257.8 LBS | BODY MASS INDEX: 36.91 KG/M2 | HEIGHT: 70 IN | DIASTOLIC BLOOD PRESSURE: 90 MMHG | HEART RATE: 79 BPM

## 2023-05-24 DIAGNOSIS — Z00.00 ENCOUNTER FOR ANNUAL HEALTH EXAMINATION: Primary | ICD-10-CM

## 2023-05-24 DIAGNOSIS — E06.3 HYPOTHYROIDISM DUE TO HASHIMOTO'S THYROIDITIS: ICD-10-CM

## 2023-05-24 DIAGNOSIS — E03.8 HYPOTHYROIDISM DUE TO HASHIMOTO'S THYROIDITIS: ICD-10-CM

## 2023-05-24 DIAGNOSIS — I10 PRIMARY HYPERTENSION: ICD-10-CM

## 2023-05-24 PROCEDURE — 99396 PREV VISIT EST AGE 40-64: CPT | Performed by: EMERGENCY MEDICINE

## 2023-05-24 PROCEDURE — 99213 OFFICE O/P EST LOW 20 MIN: CPT | Performed by: EMERGENCY MEDICINE

## 2023-05-24 PROCEDURE — 3075F SYST BP GE 130 - 139MM HG: CPT | Performed by: EMERGENCY MEDICINE

## 2023-05-24 PROCEDURE — G0442 ANNUAL ALCOHOL SCREEN 15 MIN: HCPCS | Performed by: EMERGENCY MEDICINE

## 2023-05-24 PROCEDURE — G0446 INTENS BEHAVE THER CARDIO DX: HCPCS | Performed by: EMERGENCY MEDICINE

## 2023-05-24 PROCEDURE — 1036F TOBACCO NON-USER: CPT | Performed by: EMERGENCY MEDICINE

## 2023-05-24 PROCEDURE — 3080F DIAST BP >= 90 MM HG: CPT | Performed by: EMERGENCY MEDICINE

## 2023-05-24 PROCEDURE — 96127 BRIEF EMOTIONAL/BEHAV ASSMT: CPT | Performed by: EMERGENCY MEDICINE

## 2023-05-24 NOTE — PROGRESS NOTES
Subjective   Patient ID: Derick Barone is a 62 y.o. male who presents for Physical and Follow-up (Follow up from sepsis. Finished treatment monday).    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   Follow up with ID.   Patient cleared from medical stand point. May resume dental care as needed.     Joint pains and other aches- improved with short course of prednisone and Flexeril    Hypertension-continue metoprolol and amlodipine    Hypothyroidism-continue Synthroid    Lab work completed during recent admission, not necessary to repeat at this time.     Preventative care-   Had shingles in past, does not require vaccination  Brother with colon cancer, getting colonoscopies every 5 years.   No family history of prostate cancer, PSA screening no required at this time    PH Q-9 depression screening was completed by authorized employee of the practice and answer of the questionnaire were explained and discussed with the patient.    Face-to-face with discussion completed with this individual regarding their cardiovascular risk and behavioral therapies of nutritional choices, exercise and elimination of habits contradicting to the risk. We agreed on how they may be able to reduce their current cardiovascular risk.     Screening for alcohol use completed.      Follow up in 3 months or sooner as needed     Source of history: Nurse, Medical personnel, Medical record, Patient.  History limitation: None.    HPI  62 year old male for physical and follow up     Patient has completed IV antibiotics, PICC line removed.  Reports that his overall condition and pain have greatly improved. Still  reports some occasional aches, but overall feels that he is back to his baseline.     History of right total knee replacement and hernia repair    History from hospitalization (4/17/23)-  62-year-old admitted with groin pain  Questionable iliopsoas abscess  Later on thought to be a phlegmon treated with IV antibiotics  Bone scan showed questionable uptake in sacrum suggestive of infection  Orthopedics and infectious disease consultations were obtained  ID recommended cefazolin for 5 weeks which patient is currently taking via PICC line      Allergies   Allergen Reactions    Divalproex Other    Valproic Acid Unknown       Current Outpatient Medications   Medication Sig Dispense Refill    amLODIPine (Norvasc) 5 mg tablet Take 1 tablet (5 mg) by mouth once daily. (Patient taking differently: Take 2 tablets (10 mg) by mouth once daily.) 90 tablet 3    levothyroxine (Synthroid, Levoxyl) 50 mcg tablet Take 1 tablet (50 mcg) by mouth once daily. 90 tablet 3    metoprolol succinate XL (Toprol-XL) 100 mg 24 hr tablet 1 tablet (100 mg).      cyclobenzaprine (Flexeril) 10 mg tablet Take 1 tablet (10 mg) by mouth as needed at bedtime for muscle spasms (for pain) for up to 7 days. 7 tablet 0    levothyroxine (Synthroid, Levoxyl) 50 mcg tablet Take 1 tablet (50 mcg) by mouth.      metoprolol tartrate (Lopressor) 100 mg tablet Take 1 tablet (100 mg) by mouth once daily. (Patient not taking: Reported on 5/24/2023) 90 tablet 3    metoprolol tartrate (Lopressor) 100 mg tablet Take 1 tablet (100 mg) by mouth twice a day.      Norvasc 10 mg tablet Take 1 tablet (10 mg) by mouth once daily.      ondansetron ODT (Zofran-ODT) 4 mg disintegrating tablet Take 1 tablet (4 mg) by mouth every 6 hours if needed for nausea or vomiting.      oxyCODONE (Roxicodone) 5 mg immediate release tablet Take 1 tablet by mouth every 6 hours as needed for pain for up to 3 days.      predniSONE (Deltasone) 20 mg tablet Take two pills daily for 5 days.  "(Patient not taking: Reported on 5/24/2023) 10 tablet 0     No current facility-administered medications for this visit.       Objective   Visit Vitals  /90   Pulse 79   Ht 1.778 m (5' 10\")   Wt 117 kg (257 lb 12.8 oz)   SpO2 97%   BMI 36.99 kg/m²   Smoking Status Former   BSA 2.4 m²     Physical Exam  General appearance: Alert and in no acute distress  HEENT: Normal Inspection.  Neck: Normal Inspectiopn  Respiratory: No respiratory distress.   Cardiovascular: heart rate normal. No gallop  Back: normal inspection  Skin inspection: Warm  Musculoskeletal: No deformities  Neuro: Grossly Intact  Psychiatric: Cooperative     Review of Systems  Comprehensive review of symptoms was not positive for any symptoms other than HPI.      Results including lab work, imaging reports were reviewed and wherever possible, independently verified    Family History   Problem Relation Name Age of Onset    Diabetes Mother      Coronary artery disease Father      Leukemia Sister      Diabetes Sister      Epilepsy Daughter      Seizures Daughter      Colon cancer Mother's Brother      Pancreatic cancer Mother's Brother      Throat cancer Mother's Brother      Coronary artery disease Mother's Brother       Social History     Socioeconomic History    Marital status:      Spouse name: None    Number of children: None    Years of education: None    Highest education level: None   Occupational History    None   Tobacco Use    Smoking status: Former     Types: Cigarettes    Smokeless tobacco: Never   Vaping Use    Vaping status: Never Used     Passive vaping exposure: Yes   Substance and Sexual Activity    Alcohol use: Never    Drug use: Never    Sexual activity: Not Currently   Other Topics Concern    None   Social History Narrative    None     Social Determinants of Health     Financial Resource Strain: Not on file   Food Insecurity: Not on file   Transportation Needs: Not on file   Physical Activity: Not on file   Stress: Not on " file   Social Connections: Not on file   Intimate Partner Violence: Not on file   Housing Stability: Not on file     Past Medical History:   Diagnosis Date    Acute ethmoidal sinusitis, unspecified     Acute ethmoidal sinusitis     Past Surgical History:   Procedure Laterality Date    OTHER SURGICAL HISTORY  03/20/2019    Inguinal hernia repair    OTHER SURGICAL HISTORY  12/21/2020    Knee replacement     Scribe Attestation  By signing my name below, IKeiry , Scribjessica   attest that this documentation has been prepared under the direction and in the presence of Timoteo Gibson MD.

## 2023-06-28 ENCOUNTER — TELEPHONE (OUTPATIENT)
Dept: PRIMARY CARE | Facility: CLINIC | Age: 63
End: 2023-06-28

## 2023-06-28 NOTE — TELEPHONE ENCOUNTER
PT IS HAVING DENTAL PROCEDURE ON 7/10/23. HE RECENTLY HAD SEPSIS. DENTAL OFFICE NEEDS A LETTER OF CLEARANCE FOR PT.    ANY QUESTIONS CAN REACH DORA: [phone]    FAX: [phone]

## 2023-07-20 ENCOUNTER — APPOINTMENT (OUTPATIENT)
Dept: PRIMARY CARE | Facility: CLINIC | Age: 63
End: 2023-07-20

## 2023-12-11 ENCOUNTER — OFFICE VISIT (OUTPATIENT)
Dept: PRIMARY CARE | Facility: CLINIC | Age: 63
End: 2023-12-11
Payer: COMMERCIAL

## 2023-12-11 VITALS
DIASTOLIC BLOOD PRESSURE: 86 MMHG | OXYGEN SATURATION: 95 % | WEIGHT: 276 LBS | HEART RATE: 73 BPM | SYSTOLIC BLOOD PRESSURE: 141 MMHG | HEIGHT: 70 IN | BODY MASS INDEX: 39.51 KG/M2

## 2023-12-11 DIAGNOSIS — Z00.00 ROUTINE GENERAL MEDICAL EXAMINATION AT A HEALTH CARE FACILITY: ICD-10-CM

## 2023-12-11 DIAGNOSIS — Z76.89 ENCOUNTER FOR SUPPORT AND COORDINATION OF TRANSITION OF CARE: Primary | ICD-10-CM

## 2023-12-11 DIAGNOSIS — E03.8 HYPOTHYROIDISM DUE TO HASHIMOTO'S THYROIDITIS: ICD-10-CM

## 2023-12-11 DIAGNOSIS — E06.3 HYPOTHYROIDISM DUE TO HASHIMOTO'S THYROIDITIS: ICD-10-CM

## 2023-12-11 PROCEDURE — 1159F MED LIST DOCD IN RCRD: CPT | Performed by: EMERGENCY MEDICINE

## 2023-12-11 PROCEDURE — 99496 TRANSJ CARE MGMT HIGH F2F 7D: CPT | Performed by: EMERGENCY MEDICINE

## 2023-12-11 PROCEDURE — 1160F RVW MEDS BY RX/DR IN RCRD: CPT | Performed by: EMERGENCY MEDICINE

## 2023-12-11 PROCEDURE — 3077F SYST BP >= 140 MM HG: CPT | Performed by: EMERGENCY MEDICINE

## 2023-12-11 PROCEDURE — 1036F TOBACCO NON-USER: CPT | Performed by: EMERGENCY MEDICINE

## 2023-12-11 PROCEDURE — 3079F DIAST BP 80-89 MM HG: CPT | Performed by: EMERGENCY MEDICINE

## 2023-12-11 RX ORDER — LEVOTHYROXINE SODIUM 50 UG/1
50 TABLET ORAL DAILY
Qty: 90 TABLET | Refills: 3 | Status: SHIPPED | OUTPATIENT
Start: 2023-12-11 | End: 2023-12-13 | Stop reason: SDUPTHER

## 2023-12-11 RX ORDER — LINEZOLID 600 MG/1
600 TABLET, FILM COATED ORAL 2 TIMES DAILY
COMMUNITY

## 2023-12-11 RX ORDER — AMLODIPINE BESYLATE 5 MG/1
10 TABLET ORAL DAILY
Qty: 180 TABLET | Refills: 1 | Status: SHIPPED | OUTPATIENT
Start: 2023-12-11 | End: 2023-12-13 | Stop reason: SDUPTHER

## 2023-12-11 RX ORDER — METOPROLOL SUCCINATE 100 MG/1
100 TABLET, EXTENDED RELEASE ORAL DAILY
Qty: 90 TABLET | Refills: 1 | Status: SHIPPED | OUTPATIENT
Start: 2023-12-11 | End: 2023-12-13 | Stop reason: SDUPTHER

## 2023-12-11 NOTE — PROGRESS NOTES
Subjective   Patient ID: Derick Barone is a 63 y.o. male who presents for Hospital Follow-up (Discharged 8th).    Assessment/Plan   Problem List Items Addressed This Visit       Hypothyroidism    Relevant Medications    levothyroxine (Synthroid, Levoxyl) 50 mcg tablet    Encounter for support and coordination of transition of care - Primary     Other Visit Diagnoses       Routine general medical examination at a health care facility        Relevant Medications    metoprolol succinate XL (Toprol-XL) 100 mg 24 hr tablet    amLODIPine (Norvasc) 5 mg tablet          Elbow wound/MRSA- healing well, completing oral linezolid     Iliopsoas infection with possible sacral osteomyelitis- antibiotic course completed and PICC line removed.     Joint pains and other aches- improved with short course of prednisone and Flexeril     Hypertension-continue metoprolol and amlodipine     Hypothyroidism-continue Synthroid     Lab work completed during recent admission, not necessary to repeat at this time.      Medication refilled     Preventative care-   Had shingles in past, does not require vaccination  Brother with colon cancer, getting colonoscopies every 5 years.   Grandfather with prostate cancer, will check PSA on next labs.      Follow up in 3 months or sooner as needed    Source of history: Nurse, Medical personnel, Medical record, Patient.  History limitation: None.    HPI  63 y.o. male here for transition of care visit     Patient was discharged from [facility] on 12/[facility]/23 and there was interactive contact by patient/family or facility staff on behalf of patient with me/office within 2 working days regarding patient's transition    Patient was recently admitted to the hospital.  Patient's history regarding the reason for hospital admission and the course in the hospital was reviewed with patinet and/or family.  Patient's medical records including lab work, radiology and other medical notes were reviewed.   "His medication list prior to admission and discharge medication list are compared and updated.    Patient encounter was done face-to-face    Patient is unable to give detailed history and therefore history is obtained from the chart    History from hospitalization-   He was admitted 2x recently for left elbow infection. In first admission was discharge on oral doxy. Returned with worsening pain and redness. Given IV vanco and ID consulted. Blood culture negative. Wound culture positive for MRSA.   Discharged on oral linezolid for 7 days.     Wound is healing well, no current signs of infection.     Brother with colon cancer, grandfather with prostate cancer.     Allergies   Allergen Reactions    Divalproex Other    Valproic Acid Unknown       Current Outpatient Medications   Medication Sig Dispense Refill    linezolid (Zyvox) 600 mg tablet Take 1 tablet (600 mg) by mouth 2 times a day. For 7 days      amLODIPine (Norvasc) 5 mg tablet Take 2 tablets (10 mg) by mouth once daily. 180 tablet 1    levothyroxine (Synthroid, Levoxyl) 50 mcg tablet Take 1 tablet (50 mcg) by mouth once daily. 90 tablet 3    metoprolol succinate XL (Toprol-XL) 100 mg 24 hr tablet Take 1 tablet (100 mg) by mouth once daily. 90 tablet 1     No current facility-administered medications for this visit.       Objective   Visit Vitals  /86   Pulse 73   Ht 1.778 m (5' 10\")   Wt 125 kg (276 lb)   SpO2 95%   BMI 39.60 kg/m²   Smoking Status Former   BSA 2.48 m²     Physical Exam  Vital signs as per nursing/MA documentation   General appearance: Alert and in no acute distress  HEENT: Normal Inspection   Neck: Normal Inspection   Respiratory: No respiratory distress Lungs are clear   Cardiovascular: Heart rate normal. No gallop  Back: Normal Inspection   Skin inspection: Warm   Musculoskeletal: No deformities   Neuro: Limited exam. Baseline    Review of Systems  Comprehensive review of systems as allowed by patient condition and nursing input is " negative    No visits with results within 4 Month(s) from this visit.   Latest known visit with results is:   Lab on 03/16/2023   Component Date Value Ref Range Status    Glucose 03/16/2023 91  74 - 99 mg/dL Final    Sodium 03/16/2023 144  136 - 145 mmol/L Final    Potassium 03/16/2023 5.0  3.5 - 5.3 mmol/L Final    Chloride 03/16/2023 108 (H)  98 - 107 mmol/L Final    Bicarbonate 03/16/2023 26  21 - 32 mmol/L Final    Anion Gap 03/16/2023 15  10 - 20 mmol/L Final    Urea Nitrogen 03/16/2023 28 (H)  6 - 23 mg/dL Final    Creatinine 03/16/2023 1.29  0.50 - 1.30 mg/dL Final    GFR MALE 03/16/2023 62  >90 mL/min/1.73m2 Final    Calcium 03/16/2023 9.4  8.6 - 10.3 mg/dL Final    Albumin 03/16/2023 4.2  3.4 - 5.0 g/dL Final    Alkaline Phosphatase 03/16/2023 102  33 - 136 U/L Final    Total Protein 03/16/2023 7.0  6.4 - 8.2 g/dL Final    AST 03/16/2023 25  9 - 39 U/L Final    Total Bilirubin 03/16/2023 0.5  0.0 - 1.2 mg/dL Final    ALT (SGPT) 03/16/2023 24  10 - 52 U/L Final    Cholesterol 03/16/2023 148  0 - 199 mg/dL Final    HDL 03/16/2023 44.4  mg/dL Final    Cholesterol/HDL Ratio 03/16/2023 3.3   Final    LDL 03/16/2023 74  0 - 99 mg/dL Final    VLDL 03/16/2023 30  0 - 40 mg/dL Final    Triglycerides 03/16/2023 149  0 - 149 mg/dL Final    TSH 03/16/2023 2.47  0.44 - 3.98 mIU/L Final       Radiology: Reviewed imaging in powerchart.  No results found.    Family History   Problem Relation Name Age of Onset    Diabetes Mother      Coronary artery disease Father      Leukemia Sister      Diabetes Sister      Epilepsy Daughter      Seizures Daughter      Colon cancer Mother's Brother      Pancreatic cancer Mother's Brother      Throat cancer Mother's Brother      Coronary artery disease Mother's Brother       Social History     Socioeconomic History    Marital status:      Spouse name: None    Number of children: None    Years of education: None    Highest education level: None   Occupational History    None    Tobacco Use    Smoking status: Former     Types: Cigarettes    Smokeless tobacco: Never   Vaping Use    Vaping Use: Never used   Substance and Sexual Activity    Alcohol use: Never    Drug use: Never    Sexual activity: Not Currently   Other Topics Concern    None   Social History Narrative    None     Social Determinants of Health     Financial Resource Strain: Not on file   Food Insecurity: Not on file   Transportation Needs: Not on file   Physical Activity: Not on file   Stress: Not on file   Social Connections: Not on file   Intimate Partner Violence: Not on file   Housing Stability: Not on file     Past Medical History:   Diagnosis Date    Acute ethmoidal sinusitis, unspecified     Acute ethmoidal sinusitis     Past Surgical History:   Procedure Laterality Date    OTHER SURGICAL HISTORY  03/20/2019    Inguinal hernia repair    OTHER SURGICAL HISTORY  12/21/2020    Knee replacement       Scribe Attestation  By signing my name below, IKeiry Scribjessica   attest that this documentation has been prepared under the direction and in the presence of Timoteo Gibosn MD.

## 2023-12-13 DIAGNOSIS — E03.8 HYPOTHYROIDISM DUE TO HASHIMOTO'S THYROIDITIS: ICD-10-CM

## 2023-12-13 DIAGNOSIS — E06.3 HYPOTHYROIDISM DUE TO HASHIMOTO'S THYROIDITIS: ICD-10-CM

## 2023-12-13 DIAGNOSIS — Z00.00 ROUTINE GENERAL MEDICAL EXAMINATION AT A HEALTH CARE FACILITY: ICD-10-CM

## 2023-12-13 RX ORDER — METOPROLOL SUCCINATE 100 MG/1
100 TABLET, EXTENDED RELEASE ORAL DAILY
Qty: 90 TABLET | Refills: 1 | Status: SHIPPED | OUTPATIENT
Start: 2023-12-13

## 2023-12-13 RX ORDER — LEVOTHYROXINE SODIUM 50 UG/1
50 TABLET ORAL DAILY
Qty: 90 TABLET | Refills: 1 | Status: SHIPPED | OUTPATIENT
Start: 2023-12-13

## 2023-12-13 RX ORDER — AMLODIPINE BESYLATE 5 MG/1
10 TABLET ORAL DAILY
Qty: 180 TABLET | Refills: 1 | Status: SHIPPED | OUTPATIENT
Start: 2023-12-13 | End: 2024-05-29

## 2024-01-24 ENCOUNTER — OFFICE VISIT (OUTPATIENT)
Dept: PRIMARY CARE | Facility: CLINIC | Age: 64
End: 2024-01-24
Payer: COMMERCIAL

## 2024-01-24 ENCOUNTER — LAB (OUTPATIENT)
Dept: LAB | Facility: LAB | Age: 64
End: 2024-01-24
Payer: COMMERCIAL

## 2024-01-24 VITALS
DIASTOLIC BLOOD PRESSURE: 78 MMHG | HEIGHT: 70 IN | WEIGHT: 267 LBS | SYSTOLIC BLOOD PRESSURE: 148 MMHG | BODY MASS INDEX: 38.22 KG/M2

## 2024-01-24 DIAGNOSIS — Z00.00 ROUTINE GENERAL MEDICAL EXAMINATION AT A HEALTH CARE FACILITY: ICD-10-CM

## 2024-01-24 DIAGNOSIS — I10 ESSENTIAL HYPERTENSION: ICD-10-CM

## 2024-01-24 DIAGNOSIS — D64.9 ANEMIA, UNSPECIFIED TYPE: Primary | ICD-10-CM

## 2024-01-24 DIAGNOSIS — M54.10 NERVE ROOT INFLAMMATION: ICD-10-CM

## 2024-01-24 DIAGNOSIS — E03.9 HYPOTHYROIDISM, UNSPECIFIED TYPE: ICD-10-CM

## 2024-01-24 DIAGNOSIS — D64.9 ANEMIA, UNSPECIFIED TYPE: ICD-10-CM

## 2024-01-24 DIAGNOSIS — M13.0 POLYARTHRITIS: ICD-10-CM

## 2024-01-24 LAB
ALBUMIN SERPL BCP-MCNC: 4.2 G/DL (ref 3.4–5)
ALP SERPL-CCNC: 115 U/L (ref 33–136)
ALT SERPL W P-5'-P-CCNC: 25 U/L (ref 10–52)
ANION GAP SERPL CALC-SCNC: 15 MMOL/L (ref 10–20)
AST SERPL W P-5'-P-CCNC: 25 U/L (ref 9–39)
BASOPHILS # BLD AUTO: 0.04 X10*3/UL (ref 0–0.1)
BASOPHILS NFR BLD AUTO: 0.6 %
BILIRUB SERPL-MCNC: 0.4 MG/DL (ref 0–1.2)
BUN SERPL-MCNC: 20 MG/DL (ref 6–23)
CALCIUM SERPL-MCNC: 9.5 MG/DL (ref 8.6–10.6)
CHLORIDE SERPL-SCNC: 106 MMOL/L (ref 98–107)
CO2 SERPL-SCNC: 26 MMOL/L (ref 21–32)
CREAT SERPL-MCNC: 1.01 MG/DL (ref 0.5–1.3)
EGFRCR SERPLBLD CKD-EPI 2021: 83 ML/MIN/1.73M*2
EOSINOPHIL # BLD AUTO: 0.2 X10*3/UL (ref 0–0.7)
EOSINOPHIL NFR BLD AUTO: 2.8 %
ERYTHROCYTE [DISTWIDTH] IN BLOOD BY AUTOMATED COUNT: 14.7 % (ref 11.5–14.5)
GLUCOSE SERPL-MCNC: 152 MG/DL (ref 74–99)
HCT VFR BLD AUTO: 43.4 % (ref 41–52)
HGB BLD-MCNC: 13.8 G/DL (ref 13.5–17.5)
IMM GRANULOCYTES # BLD AUTO: 0.04 X10*3/UL (ref 0–0.7)
IMM GRANULOCYTES NFR BLD AUTO: 0.6 % (ref 0–0.9)
LYMPHOCYTES # BLD AUTO: 1.53 X10*3/UL (ref 1.2–4.8)
LYMPHOCYTES NFR BLD AUTO: 21.2 %
MCH RBC QN AUTO: 27.5 PG (ref 26–34)
MCHC RBC AUTO-ENTMCNC: 31.8 G/DL (ref 32–36)
MCV RBC AUTO: 87 FL (ref 80–100)
MONOCYTES # BLD AUTO: 0.48 X10*3/UL (ref 0.1–1)
MONOCYTES NFR BLD AUTO: 6.6 %
NEUTROPHILS # BLD AUTO: 4.93 X10*3/UL (ref 1.2–7.7)
NEUTROPHILS NFR BLD AUTO: 68.2 %
NRBC BLD-RTO: 0 /100 WBCS (ref 0–0)
PLATELET # BLD AUTO: 200 X10*3/UL (ref 150–450)
POTASSIUM SERPL-SCNC: 4.3 MMOL/L (ref 3.5–5.3)
PROT SERPL-MCNC: 7.2 G/DL (ref 6.4–8.2)
RBC # BLD AUTO: 5.01 X10*6/UL (ref 4.5–5.9)
SODIUM SERPL-SCNC: 143 MMOL/L (ref 136–145)
TSH SERPL-ACNC: 1.52 MIU/L (ref 0.44–3.98)
WBC # BLD AUTO: 7.2 X10*3/UL (ref 4.4–11.3)

## 2024-01-24 PROCEDURE — 3078F DIAST BP <80 MM HG: CPT | Performed by: EMERGENCY MEDICINE

## 2024-01-24 PROCEDURE — 1160F RVW MEDS BY RX/DR IN RCRD: CPT | Performed by: EMERGENCY MEDICINE

## 2024-01-24 PROCEDURE — 3077F SYST BP >= 140 MM HG: CPT | Performed by: EMERGENCY MEDICINE

## 2024-01-24 PROCEDURE — 80053 COMPREHEN METABOLIC PANEL: CPT

## 2024-01-24 PROCEDURE — 85025 COMPLETE CBC W/AUTO DIFF WBC: CPT

## 2024-01-24 PROCEDURE — 99214 OFFICE O/P EST MOD 30 MIN: CPT | Performed by: EMERGENCY MEDICINE

## 2024-01-24 PROCEDURE — 84443 ASSAY THYROID STIM HORMONE: CPT

## 2024-01-24 PROCEDURE — 1159F MED LIST DOCD IN RCRD: CPT | Performed by: EMERGENCY MEDICINE

## 2024-01-24 PROCEDURE — 1036F TOBACCO NON-USER: CPT | Performed by: EMERGENCY MEDICINE

## 2024-01-24 PROCEDURE — 36415 COLL VENOUS BLD VENIPUNCTURE: CPT

## 2024-01-24 PROCEDURE — 1157F ADVNC CARE PLAN IN RCRD: CPT | Performed by: EMERGENCY MEDICINE

## 2024-01-24 RX ORDER — PREDNISONE 20 MG/1
40 TABLET ORAL DAILY
Qty: 14 TABLET | Refills: 0 | Status: SHIPPED | OUTPATIENT
Start: 2024-01-24 | End: 2024-01-31

## 2024-01-24 NOTE — PROGRESS NOTES
Subjective   Patient ID: Derick Barone is a 63 y.o. male who presents for Muscle Pain.    Assessment/Plan   Problem List Items Addressed This Visit       Hypothyroidism    Relevant Orders    TSH with reflex to Free T4 if abnormal    Polyarthritis    Essential hypertension     Other Visit Diagnoses       Anemia, unspecified type    -  Primary    Relevant Orders    CBC and Auto Differential    Routine general medical examination at a health care facility        Relevant Orders    Comprehensive Metabolic Panel    Nerve root inflammation        Relevant Medications    predniSONE (Deltasone) 20 mg tablet          Joint pains and other aches- No current concern of infection. Will treat with short course of prednisone.    Elbow wound/MRSA- completed oral linezolid      Iliopsoas infection with possible sacral osteomyelitis- antibiotic course completed and PICC line removed.      Hypertension-continue metoprolol and amlodipine     Hypothyroidism-continue Synthroid     Lab work      Preventative care-   Had shingles in past, does not require vaccination  Brother with colon cancer, getting colonoscopies every 5 years.   Grandfather with prostate cancer, will check PSA on next labs.      Follow up in 3 months or sooner as needed    Source of history: Nurse, Medical personnel, Medical record, Patient.  History limitation: None.    HPI  63 y.o. male here for follow up visit     Presents with diffuse joint and muscle aches.   Sometimes multiple joint hurt sometimes just one. Wrists always hurt. Towards evening gets worse. In evening can't even use hand to get up because of wrist pain.   30 years ago diagnosed with arthritis.   Covid 1 months ago. Cough since but getting better.     History from hospitalization (12/2023)-   He was admitted 2x recently for left elbow infection. In first admission was discharge on oral doxy. Returned with worsening pain and redness. Given IV vanco and ID consulted. Blood culture negative. Wound  "culture positive for MRSA.   Discharged on oral linezolid for 7 days.      Wound is healing well, no current signs of infection.      Brother with colon cancer, grandfather with prostate cancer.       Allergies   Allergen Reactions    Divalproex Other    Valproic Acid Unknown       Current Outpatient Medications   Medication Sig Dispense Refill    amLODIPine (Norvasc) 5 mg tablet Take 2 tablets (10 mg) by mouth once daily. 180 tablet 1    levothyroxine (Synthroid, Levoxyl) 50 mcg tablet Take 1 tablet (50 mcg) by mouth once daily. 90 tablet 1    linezolid (Zyvox) 600 mg tablet Take 1 tablet (600 mg) by mouth 2 times a day. For 7 days      metoprolol succinate XL (Toprol-XL) 100 mg 24 hr tablet Take 1 tablet (100 mg) by mouth once daily. 90 tablet 1    predniSONE (Deltasone) 20 mg tablet Take 2 tablets (40 mg) by mouth once daily for 7 days. TWO PILLS DAILY 14 tablet 0     No current facility-administered medications for this visit.       Objective   Visit Vitals  /78   Ht 1.778 m (5' 10\")   Wt 121 kg (267 lb)   BMI 38.31 kg/m²   Smoking Status Former   BSA 2.44 m²     Physical Exam  Vital signs as per nursing/MA documentation   General appearance: Alert and in no acute distress  HEENT: Normal Inspection   Neck: Normal Inspection   Respiratory: No respiratory distress Lungs are clear   Cardiovascular: Heart rate normal. No gallop  Back: Normal Inspection   Skin inspection: Warm   Musculoskeletal: No deformities   Neuro: Limited exam. Baseline    Review of Systems  Comprehensive review of systems as allowed by patient condition and nursing input is negative    No visits with results within 4 Month(s) from this visit.   Latest known visit with results is:   Lab on 03/16/2023   Component Date Value Ref Range Status    Glucose 03/16/2023 91  74 - 99 mg/dL Final    Sodium 03/16/2023 144  136 - 145 mmol/L Final    Potassium 03/16/2023 5.0  3.5 - 5.3 mmol/L Final    Chloride 03/16/2023 108 (H)  98 - 107 mmol/L Final    " Bicarbonate 03/16/2023 26  21 - 32 mmol/L Final    Anion Gap 03/16/2023 15  10 - 20 mmol/L Final    Urea Nitrogen 03/16/2023 28 (H)  6 - 23 mg/dL Final    Creatinine 03/16/2023 1.29  0.50 - 1.30 mg/dL Final    GFR MALE 03/16/2023 62  >90 mL/min/1.73m2 Final    Calcium 03/16/2023 9.4  8.6 - 10.3 mg/dL Final    Albumin 03/16/2023 4.2  3.4 - 5.0 g/dL Final    Alkaline Phosphatase 03/16/2023 102  33 - 136 U/L Final    Total Protein 03/16/2023 7.0  6.4 - 8.2 g/dL Final    AST 03/16/2023 25  9 - 39 U/L Final    Total Bilirubin 03/16/2023 0.5  0.0 - 1.2 mg/dL Final    ALT (SGPT) 03/16/2023 24  10 - 52 U/L Final    Cholesterol 03/16/2023 148  0 - 199 mg/dL Final    HDL 03/16/2023 44.4  mg/dL Final    Cholesterol/HDL Ratio 03/16/2023 3.3   Final    LDL 03/16/2023 74  0 - 99 mg/dL Final    VLDL 03/16/2023 30  0 - 40 mg/dL Final    Triglycerides 03/16/2023 149  0 - 149 mg/dL Final    TSH 03/16/2023 2.47  0.44 - 3.98 mIU/L Final       Radiology: Reviewed imaging in powerchart.  No results found.    Family History   Problem Relation Name Age of Onset    Diabetes Mother      Coronary artery disease Father      Leukemia Sister      Diabetes Sister      Epilepsy Daughter      Seizures Daughter      Colon cancer Mother's Brother      Pancreatic cancer Mother's Brother      Throat cancer Mother's Brother      Coronary artery disease Mother's Brother       Social History     Socioeconomic History    Marital status:      Spouse name: None    Number of children: None    Years of education: None    Highest education level: None   Occupational History    None   Tobacco Use    Smoking status: Former     Types: Cigarettes    Smokeless tobacco: Never   Vaping Use    Vaping Use: Never used   Substance and Sexual Activity    Alcohol use: Never    Drug use: Never    Sexual activity: Not Currently   Other Topics Concern    None   Social History Narrative    None     Social Determinants of Health     Financial Resource Strain: Not on file    Food Insecurity: Not on file   Transportation Needs: Not on file   Physical Activity: Not on file   Stress: Not on file   Social Connections: Not on file   Intimate Partner Violence: Not on file   Housing Stability: Not on file     Past Medical History:   Diagnosis Date    Acute ethmoidal sinusitis, unspecified     Acute ethmoidal sinusitis     Past Surgical History:   Procedure Laterality Date    OTHER SURGICAL HISTORY  03/20/2019    Inguinal hernia repair    OTHER SURGICAL HISTORY  12/21/2020    Knee replacement       Scribe Attestation  By signing my name below, IKeiry , Scribjessica   attest that this documentation has been prepared under the direction and in the presence of Timoteo Gibson MD.

## 2024-04-22 ENCOUNTER — HOSPITAL ENCOUNTER (EMERGENCY)
Facility: HOSPITAL | Age: 64
Discharge: HOME | End: 2024-04-22
Payer: COMMERCIAL

## 2024-04-22 ENCOUNTER — APPOINTMENT (OUTPATIENT)
Dept: RADIOLOGY | Facility: HOSPITAL | Age: 64
End: 2024-04-22
Payer: COMMERCIAL

## 2024-04-22 VITALS
TEMPERATURE: 97.9 F | HEIGHT: 70 IN | WEIGHT: 265 LBS | SYSTOLIC BLOOD PRESSURE: 180 MMHG | HEART RATE: 84 BPM | DIASTOLIC BLOOD PRESSURE: 103 MMHG | RESPIRATION RATE: 16 BRPM | OXYGEN SATURATION: 96 % | BODY MASS INDEX: 37.94 KG/M2
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
## 2024-04-22 DIAGNOSIS — M25.562 ACUTE PAIN OF LEFT KNEE: Primary | ICD-10-CM

## 2024-04-22 PROCEDURE — 73564 X-RAY EXAM KNEE 4 OR MORE: CPT | Mod: LEFT SIDE | Performed by: RADIOLOGY

## 2024-04-22 PROCEDURE — 99283 EMERGENCY DEPT VISIT LOW MDM: CPT

## 2024-04-22 PROCEDURE — 73564 X-RAY EXAM KNEE 4 OR MORE: CPT | Mod: LT

## 2024-04-22 PROCEDURE — 2500000001 HC RX 250 WO HCPCS SELF ADMINISTERED DRUGS (ALT 637 FOR MEDICARE OP): Performed by: REGISTERED NURSE

## 2024-04-22 RX ORDER — OXYCODONE AND ACETAMINOPHEN 5; 325 MG/1; MG/1
1 TABLET ORAL ONCE
Status: COMPLETED | OUTPATIENT
Start: 2024-04-22 | End: 2024-04-22

## 2024-04-22 RX ORDER — NAPROXEN SODIUM 550 MG/1
550 TABLET ORAL 2 TIMES DAILY PRN
Qty: 10 TABLET | Refills: 0 | Status: SHIPPED | OUTPATIENT
Start: 2024-04-22 | End: 2024-04-27

## 2024-04-22 RX ADMIN — OXYCODONE HYDROCHLORIDE AND ACETAMINOPHEN 1 TABLET: 5; 325 TABLET ORAL at 17:58

## 2024-04-22 ASSESSMENT — LIFESTYLE VARIABLES
EVER FELT BAD OR GUILTY ABOUT YOUR DRINKING: NO
HAVE YOU EVER FELT YOU SHOULD CUT DOWN ON YOUR DRINKING: NO
HAVE PEOPLE ANNOYED YOU BY CRITICIZING YOUR DRINKING: NO
EVER HAD A DRINK FIRST THING IN THE MORNING TO STEADY YOUR NERVES TO GET RID OF A HANGOVER: NO
TOTAL SCORE: 0

## 2024-04-22 ASSESSMENT — COLUMBIA-SUICIDE SEVERITY RATING SCALE - C-SSRS
6. HAVE YOU EVER DONE ANYTHING, STARTED TO DO ANYTHING, OR PREPARED TO DO ANYTHING TO END YOUR LIFE?: NO
2. HAVE YOU ACTUALLY HAD ANY THOUGHTS OF KILLING YOURSELF?: NO
1. IN THE PAST MONTH, HAVE YOU WISHED YOU WERE DEAD OR WISHED YOU COULD GO TO SLEEP AND NOT WAKE UP?: NO

## 2024-04-22 ASSESSMENT — PAIN - FUNCTIONAL ASSESSMENT: PAIN_FUNCTIONAL_ASSESSMENT: 0-10

## 2024-04-22 ASSESSMENT — PAIN SCALES - GENERAL: PAINLEVEL_OUTOF10: 8

## 2024-04-22 NOTE — Clinical Note
Derick Barone was seen and treated in our emergency department on 4/22/2024.  He may return to work on 04/24/2024.  Return to work after being evaluated by Workmen's Compensation provider     If you have any questions or concerns, please don't hesitate to call.      Sheila Roy, APRN-CNP

## 2024-04-22 NOTE — ED PROVIDER NOTES
HPI   Chief Complaint   Patient presents with    Knee Pain     Pt has left knee pain after falling at work       63-year-old male presents emergency department today for evaluation of left knee pain after falling at work.  Patient tells me shortly before arrival he slipped on metal shot at his job and fell forward with his left knee bending behind him.  Patient tells me that he has been having pain in the lateral adage of his left knee since.  Patient tells me the pain is worse when he walks.  Patient rates his pain an 8/10 on the pain scale.  Patient denies taking any medications prior to arrival.      History provided by:  Patient   used: No                        [place] Coma Scale Score: 15                     Patient History   Past Medical History:   Diagnosis Date    Acute ethmoidal sinusitis, unspecified     Acute ethmoidal sinusitis     Past Surgical History:   Procedure Laterality Date    OTHER SURGICAL HISTORY  03/20/2019    Inguinal hernia repair    OTHER SURGICAL HISTORY  12/21/2020    Knee replacement     Family History   Problem Relation Name Age of Onset    Diabetes Mother      Coronary artery disease Father      Leukemia Sister      Diabetes Sister      Epilepsy Daughter      Seizures Daughter      Colon cancer Mother's Brother      Pancreatic cancer Mother's Brother      Throat cancer Mother's Brother      Coronary artery disease Mother's Brother       Social History     Tobacco Use    Smoking status: Former     Types: Cigarettes    Smokeless tobacco: Never   Vaping Use    Vaping status: Never Used   Substance Use Topics    Alcohol use: Never    Drug use: Never       Physical Exam   ED Triage Vitals [04/22/24 1721]   Temperature Heart Rate Respirations BP   36.6 °C (97.9 °F) 84 16 (!) 180/103      Pulse Ox Temp src Heart Rate Source Patient Position   96 % -- -- --      BP Location FiO2 (%)     -- --       Physical Exam  Vitals and nursing note reviewed.   Constitutional:        Appearance: Normal appearance.   HENT:      Head: Normocephalic and atraumatic.   Cardiovascular:      Rate and Rhythm: Normal rate.      Pulses: Normal pulses.   Pulmonary:      Effort: Pulmonary effort is normal.   Musculoskeletal:      Right knee: Normal.      Left knee: No swelling, deformity or ecchymosis. Tenderness present over the medial joint line.   Skin:     General: Skin is warm and dry.      Capillary Refill: Capillary refill takes less than 2 seconds.   Neurological:      General: No focal deficit present.      Mental Status: He is alert and oriented to person, place, and time.   Psychiatric:         Mood and Affect: Mood normal.         Behavior: Behavior normal.         ED Course & MDM   Diagnoses as of 04/22/24 1818   Acute pain of left knee   Work related injury       Medical Decision Making  Patient seen examined emergency department; patient is healthy nontoxic appearance not appear any acute distress.  Clinical exam significant for pain with palpation of the medial aspect of the left knee.  No swelling, no deformity, no bruising noted on exam.  Patient is able to extend and flex knee independently.  MSPs are intact distally.  Will order patient Percocet for pain and will obtain imaging of left knee to evaluate for any acute fractures or dislocations.    Imaging of left knee with mild degenerative changes without acute abnormality.    Upon reevaluation patient tells me he is feeling better post administration of medications.  Patient I discussed diagnostic results.  Patient will be given an Ace wrap as the pants he is on the right now are too tight for nurse to place.  Patient instructed on the use.  Patient also instructed on the use of RICE for symptom management at home.  Patient be given a short course of anti-inflammatories for symptom management.  Patient given med works in [place] for return to work instruction.  Patient encouraged to reach out to his employer to see if they have their own  Workmen's Compensation provider.  Patient verbalizes understanding.  Additionally patient [facility] and if pain is not better in 3 to 5 days.  All patient's questions and concerns were addressed prior to discharge.  Patient discharged home in stable condition.    Labs Reviewed - No data to display    XR knee left 4+ views   Final Result        Mild degenerative change. No acute abnormality        MACRO:   None        Signed by: Francisco Smith 4/22/2024 5:49 PM   Dictation workstation:   [id number]            Procedure  Procedures     Sheila Roy, AMRITA-CNP  04/22/24 3724

## 2024-05-28 DIAGNOSIS — Z00.00 ROUTINE GENERAL MEDICAL EXAMINATION AT A HEALTH CARE FACILITY: ICD-10-CM

## 2024-05-29 RX ORDER — AMLODIPINE BESYLATE 5 MG/1
10 TABLET ORAL DAILY
Qty: 180 TABLET | Refills: 1 | Status: SHIPPED | OUTPATIENT
Start: 2024-05-29

## 2024-08-21 DIAGNOSIS — E03.8 HYPOTHYROIDISM DUE TO HASHIMOTO'S THYROIDITIS: ICD-10-CM

## 2024-08-21 DIAGNOSIS — E06.3 HYPOTHYROIDISM DUE TO HASHIMOTO'S THYROIDITIS: ICD-10-CM

## 2024-08-22 RX ORDER — LEVOTHYROXINE SODIUM 50 UG/1
50 TABLET ORAL DAILY
Qty: 90 TABLET | Refills: 1 | Status: SHIPPED | OUTPATIENT
Start: 2024-08-22

## 2024-09-12 ENCOUNTER — OFFICE VISIT (OUTPATIENT)
Dept: ORTHOPEDIC SURGERY | Facility: CLINIC | Age: 64
End: 2024-09-12
Payer: MEDICARE

## 2024-09-12 ENCOUNTER — HOSPITAL ENCOUNTER (OUTPATIENT)
Dept: RADIOLOGY | Facility: CLINIC | Age: 64
Discharge: HOME | End: 2024-09-12
Payer: MEDICARE

## 2024-09-12 VITALS — WEIGHT: 270 LBS | BODY MASS INDEX: 38.65 KG/M2 | HEIGHT: 70 IN

## 2024-09-12 DIAGNOSIS — M23.42 LOOSE BODY IN KNEE, LEFT: ICD-10-CM

## 2024-09-12 DIAGNOSIS — M25.561 RIGHT KNEE PAIN, UNSPECIFIED CHRONICITY: ICD-10-CM

## 2024-09-12 DIAGNOSIS — M25.562 ACUTE PAIN OF LEFT KNEE: ICD-10-CM

## 2024-09-12 DIAGNOSIS — M25.561 RIGHT KNEE PAIN, UNSPECIFIED CHRONICITY: Primary | ICD-10-CM

## 2024-09-12 PROCEDURE — 99214 OFFICE O/P EST MOD 30 MIN: CPT | Performed by: FAMILY MEDICINE

## 2024-09-12 PROCEDURE — 1160F RVW MEDS BY RX/DR IN RCRD: CPT | Performed by: FAMILY MEDICINE

## 2024-09-12 PROCEDURE — 1159F MED LIST DOCD IN RCRD: CPT | Performed by: FAMILY MEDICINE

## 2024-09-12 PROCEDURE — 73564 X-RAY EXAM KNEE 4 OR MORE: CPT | Mod: LT

## 2024-09-12 PROCEDURE — 1157F ADVNC CARE PLAN IN RCRD: CPT | Performed by: FAMILY MEDICINE

## 2024-09-12 PROCEDURE — 1036F TOBACCO NON-USER: CPT | Performed by: FAMILY MEDICINE

## 2024-09-12 PROCEDURE — 3008F BODY MASS INDEX DOCD: CPT | Performed by: FAMILY MEDICINE

## 2024-09-12 RX ORDER — NAPROXEN 500 MG/1
500 TABLET ORAL
Qty: 28 TABLET | Refills: 0 | Status: SHIPPED | OUTPATIENT
Start: 2024-09-12 | End: 2024-09-26

## 2024-09-12 RX ORDER — METHYLPREDNISOLONE 4 MG/1
TABLET ORAL
Qty: 1 EACH | Refills: 0 | Status: SHIPPED | OUTPATIENT
Start: 2024-09-12

## 2024-09-12 ASSESSMENT — PATIENT HEALTH QUESTIONNAIRE - PHQ9
1. LITTLE INTEREST OR PLEASURE IN DOING THINGS: NOT AT ALL
SUM OF ALL RESPONSES TO PHQ9 QUESTIONS 1 AND 2: 0
2. FEELING DOWN, DEPRESSED OR HOPELESS: NOT AT ALL

## 2024-09-12 NOTE — PROGRESS NOTES
Acute Injury New Patient Visit    CC:   Chief Complaint   Patient presents with    Right Knee - Pain     Right knee pain, below his kneecap , pain from overuse, xrays today       HPI: Derick is a 64 y.o.male who presents today with new complaints of worsening anterior and medial left knee pain.  He denies any numbness tingling or burning no obvious injury or trauma.  States is from overuse.  He is current Dr. Denis Calderon patient status post right knee replacement approximately 7 years ago.  He denies any open cuts wounds or sores has pain and discomfort getting in and out of a deep position increased activities with clicking and popping to the front of the knee.  He typically does not like to utilize braces.  He still very active and busy.        Review of Systems   GENERAL: Negative for malaise, significant weight loss, fever  MUSCULOSKELETAL: See HPI  NEURO: Negative for numbness / tingling     Past Medical History  Past Medical History:   Diagnosis Date    Acute ethmoidal sinusitis, unspecified     Acute ethmoidal sinusitis       Medication review  Medication Documentation Review Audit       Reviewed by Cole C Budinsky, MD (Physician) on 09/12/24 at 1229      Medication Order Taking? Sig Documenting Provider Last Dose Status   amLODIPine (Norvasc) 5 mg tablet [id number]  TAKE 2 TABLETS ONCE DAILY Timoteo Gibson MD  Active   levothyroxine (Synthroid, Levoxyl) 50 mcg tablet [id number]  TAKE 1 TABLET DAILY Timoteo Gibson MD  Active   linezolid (Zyvox) 600 mg tablet [id number] No Take 1 tablet (600 mg) by mouth 2 times a day. For 7 days Historical Provider, MD Taking Active   methylPREDNISolone (Medrol Dospak) 4 mg tablets [id number]  Follow schedule on package instructions Cole C Budinsky, MD  Active   metoprolol succinate XL (Toprol-XL) 100 mg 24 hr tablet [id number] No Take 1 tablet (100 mg) by mouth once daily. Timoteo Gibson MD Taking Active   naproxen (Naprosyn) 500 mg tablet [id number]  Take 1 tablet (500 mg) by  mouth 2 times daily (morning and late afternoon) for 14 days. Cole C Budinsky, MD  Active                    Allergies  Allergies   Allergen Reactions    Divalproex Other    Valproic Acid Unknown       Social History  Social History     Socioeconomic History    Marital status:      Spouse name: Not on file    Number of children: Not on file    Years of education: Not on file    Highest education level: Not on file   Occupational History    Not on file   Tobacco Use    Smoking status: Former     Types: Cigarettes    Smokeless tobacco: Never   Vaping Use    Vaping status: Never Used   Substance and Sexual Activity    Alcohol use: Never    Drug use: Never    Sexual activity: Not Currently   Other Topics Concern    Not on file   Social History Narrative    Not on file     Social Determinants of Health     Financial Resource Strain: Not on file   Food Insecurity: Not on file   Transportation Needs: Not on file   Physical Activity: Not on file   Stress: Not on file   Social Connections: Not on file   Intimate Partner Violence: Not on file   Housing Stability: Not on file       Surgical History  Past Surgical History:   Procedure Laterality Date    OTHER SURGICAL HISTORY  03/20/2019    Inguinal hernia repair    OTHER SURGICAL HISTORY  12/21/2020    Knee replacement       Physical Exam:  GENERAL:  Patient is awake, alert, and oriented to person place and time.  Patient appears well nourished and well kept.  Affect Calm, Not Acutely Distressed.  HEENT:  Normocephalic, Atraumatic, EOMI  CARDIOVASCULAR:  Hemodynamically stable.  RESPIRATORY:  Normal respirations with unlabored breathing.  NEURO: Gross sensation intact to the lower extremities bilaterally.  Extremity: Left knee exam: The affected knee was examined and inspected and was tender to the touch along the medial and anterior aspect with catching, locking or mechanical symptoms. The skin was intact without breakdown or open wound. Old incisions if present were  healed. There was a mild Cain exam seen with some evidence of instability & weakness in the collateral ligaments with varus/valgus stress & laxity in the anterior or posterior planes. There was a negative Lachman´s test, pivot shift test and posterior drawer sign with no foot drop, numbness or tingling. Sensation, reflexes and pulses in the foot and ankle are preserved. There was an effusion. Range of motion showed good straight leg raise with flexion to 135 degrees and extension to 0 degrees. The patient had the ability to bear weight, but with discomfort. The patient´s gait was antalgic secondary to the discomfort.      Diagnostics: X-rays today demonstrate mild to moderate osteoarthritic changes with medial joint space narrowing also question small prepatellar loose body with patellofemoral arthrosis seen.        Procedure: None  Procedures    Assessment:   Problem List Items Addressed This Visit    None  Visit Diagnoses       Right knee pain, unspecified chronicity    -  Primary    Acute pain of left knee        Loose body in knee, left        Relevant Medications    methylPREDNISolone (Medrol Dospak) 4 mg tablets    naproxen (Naprosyn) 500 mg tablet    Other Relevant Orders    MR knee left wo IV contrast             Plan: At this time we will offer the patient a short course steroid pack anti-inflammatory and an MRI.  Discussed with the patient possible meniscus pathology versus loose body under the kneecap given his acute onset of mechanical pain.  He can also ice and utilize topical muscle rubs creams as needed.  He was considering a simple over-the-counter compression sleeve as he states the bigger hinged knee braces do never stay on his skin or provide him any relief.  Patient would like to follow-up with his surgeon Dr. Denis Calderon going forward to further discuss the results of the MRI and his left knee.  Orders Placed This Encounter    MR knee left wo IV contrast    methylPREDNISolone (Medrol  Dospak) 4 mg tablets    naproxen (Naprosyn) 500 mg tablet      At the conclusion of the visit there were no further questions by the patient/family regarding their plan of care.  Patient was instructed to call or return with any issues, questions, or concerns regarding their injury and/or treatment plan described above.     09/12/24 at 12:29 PM - Cole C Budinsky, MD    Office: [phone]    This note was prepared using voice recognition software.  The details of this note are correct and have been reviewed, and corrected to the best of my ability.  Some grammatical errors may persist related to the Dragon software.

## 2024-10-01 ENCOUNTER — HOSPITAL ENCOUNTER (OUTPATIENT)
Dept: RADIOLOGY | Facility: HOSPITAL | Age: 64
Discharge: HOME | End: 2024-10-01
Payer: MEDICARE

## 2024-10-01 DIAGNOSIS — M23.42 LOOSE BODY IN KNEE, LEFT: ICD-10-CM

## 2024-10-01 PROCEDURE — 73721 MRI JNT OF LWR EXTRE W/O DYE: CPT | Mod: LEFT SIDE | Performed by: STUDENT IN AN ORGANIZED HEALTH CARE EDUCATION/TRAINING PROGRAM

## 2024-10-01 PROCEDURE — 73721 MRI JNT OF LWR EXTRE W/O DYE: CPT | Mod: LT

## 2024-10-21 ENCOUNTER — APPOINTMENT (OUTPATIENT)
Dept: ORTHOPEDIC SURGERY | Facility: CLINIC | Age: 64
End: 2024-10-21
Payer: MEDICARE

## 2024-10-21 DIAGNOSIS — M17.12 PRIMARY OSTEOARTHRITIS OF LEFT KNEE: Primary | ICD-10-CM

## 2024-10-21 PROCEDURE — 1157F ADVNC CARE PLAN IN RCRD: CPT | Performed by: ORTHOPAEDIC SURGERY

## 2024-10-21 PROCEDURE — 20610 DRAIN/INJ JOINT/BURSA W/O US: CPT | Performed by: ORTHOPAEDIC SURGERY

## 2024-10-21 PROCEDURE — 99213 OFFICE O/P EST LOW 20 MIN: CPT | Performed by: ORTHOPAEDIC SURGERY

## 2024-10-21 RX ORDER — BETAMETHASONE SODIUM PHOSPHATE AND BETAMETHASONE ACETATE 3; 3 MG/ML; MG/ML
2 INJECTION, SUSPENSION INTRA-ARTICULAR; INTRALESIONAL; INTRAMUSCULAR; SOFT TISSUE
Status: COMPLETED | OUTPATIENT
Start: 2024-10-21 | End: 2024-10-21

## 2024-10-21 RX ORDER — LIDOCAINE HYDROCHLORIDE 10 MG/ML
5 INJECTION, SOLUTION INFILTRATION; PERINEURAL
Status: COMPLETED | OUTPATIENT
Start: 2024-10-21 | End: 2024-10-21

## 2024-10-21 NOTE — PROGRESS NOTES
History of present illness: Left knee pain arthritis    His x-ray did not look too bad his MRI showed more significant arthritis in the patellofemoral joint left side    We did his right knee replacement 7 years ago    He tried a Medrol Dosepak without much response    Physical exam:    General: No acute distress or breathing difficulty or discomfort, pleasant and cooperative with the examination.    Extremities: The affected left knee was examined and inspected and was tender to touch along the medial and lateral aspect with catching, locking or mechanical symptoms.    The skin was intact without breakdown or open wound.  Old incisions of present were healed.    There was a mild Cain exam seen with some evidence of instability and weakness in the collateral ligaments with varus valgus stressing and laxity in the anterior or posterior planes.    There was a negative Lachman's test pivot shift test and posterior drawer sign with no foot drop, numbness or tingling.    Sensation, reflexes and pulses in the foot and ankle are preserved.  There was an effusion.  Range of motion showed good straight leg raise with flexion to 150 degrees and extension to 0 degrees.  The patient had the ability to bear weight but with discomfort.  The patient's gait was antalgic secondary to discomfort.    Before aspiration injection the benefits of a cortisone injection including infection, local skin irritation, skin atrophy, calcification, continued pain and discomfort, elevated blood sugar, burning, failure to relieve pain, possible late infection were discussed with the patient.    Postprocedure discomfort can be alleviated with additional medications, ice, elevation, rest over the first 24 hours as recommended.    Patient verbalized understanding and wanted to proceed with the planned procedure.    After informed consent was provided and allergies verified, the patient was positioned appropriately on the bed.  The left knee to be  aspirated and injected was prepped and draped in a sterile fashion.  The skin was anesthetized with ethyl chloride spray.  A joint aspiration was to be performed an 18-gauge needle was used otherwise a 22-gauge needle was used to inject the appropriate joint.    Joint injection was performed with a mixture of 5 cc 1% lidocaine plain and 2 cc Celestone Soluspan 6 mg per mL.  The needle was removed and the puncture site closed and sealed with a Band-Aid.  The patient tolerated the procedure well.          Diagnostic studies: MRI shows combination of meniscus tearing and then moderate severe patellofemoral arthrosis left knee    Impression: Combination of arthritis and meniscus tearing left knee    Plan: Injection left knee today ice elevate follow-up 2 weeks when he returns    His other knee did not really do well with gel shots when he does return we will talk about options including an arthroscopy he certainly would like to avoid knee replacement if possible his patellofemoral joint is the most arthritic but the remaining joint space looked pretty well-preserved risks and benefits of the surgery progressive arthritis arthroscopy discussed hopefully will see him back significant proved with simple injections and again when he returns in 2 weeks no x-rays needed we will talk about his options if he does not do well it would be either gel shot knee arthroscopy and hopefully he can avoid knee replacement    L Inj/Asp: L knee on 10/21/2024 8:16 AM  Indications: pain and diagnostic evaluation  Details: 22 G needle, anteromedial approach  Medications: 2 mL betamethasone acet,sod phos 6 mg/mL; 5 mL lidocaine 10 mg/mL (1 %)  Outcome: tolerated well, no immediate complications  Procedure, treatment alternatives, risks and benefits explained, specific risks discussed. Consent was given by the patient. Immediately prior to procedure a time out was called to verify the correct patient, procedure, equipment, support staff and  site/side marked as required. Patient was prepped and draped in the usual sterile fashion.

## 2024-11-04 ENCOUNTER — APPOINTMENT (OUTPATIENT)
Dept: ORTHOPEDIC SURGERY | Facility: CLINIC | Age: 64
End: 2024-11-04
Payer: MEDICARE

## 2024-11-04 DIAGNOSIS — M17.12 PRIMARY OSTEOARTHRITIS OF LEFT KNEE: Primary | ICD-10-CM

## 2024-11-04 PROCEDURE — 1157F ADVNC CARE PLAN IN RCRD: CPT | Performed by: ORTHOPAEDIC SURGERY

## 2024-11-04 PROCEDURE — 99213 OFFICE O/P EST LOW 20 MIN: CPT | Performed by: ORTHOPAEDIC SURGERY

## 2024-11-04 NOTE — PROGRESS NOTES
History of present illness: History knee arthritis left severe in nature moderate severe patellofemoral arthrosis most noticeably an MRI    He did not respond well to recent cortisone    He is guillermo try 1 final gel injection series    To probably have his knee replaced sometime in the late winter or spring 2025    He had a successful right knee replacement years ago        Physical exam:    General: No acute distress or breathing difficulty or discomfort, pleasant and cooperative with the examination.    Extremities: The affected left knee was examined and inspected and was tender to touch along the medial and lateral aspect with catching, locking or mechanical symptoms.    The skin was intact without breakdown or open wound.  Old incisions of present were healed.    There was a mild Cain exam seen with some evidence of instability and weakness in the collateral ligaments with varus valgus stressing and laxity in the anterior or posterior planes.    There was a negative Lachman's test pivot shift test and posterior drawer sign with no foot drop, numbness or tingling.    Sensation, reflexes and pulses in the foot and ankle are preserved.  There was an effusion.  Range of motion showed good straight leg raise with flexion to 150 degrees and extension to 0 degrees.  The patient had the ability to bear weight but with discomfort.  The patient's gait was antalgic secondary to discomfort    Diagnostic studies: No new x-ray    Impression: Left knee moderate severe patellofemoral arthrosis    Plan: Left knee moderate to severe patellofemoral arthrosis    Patient now for final gel injections get through the winter and probably knee replacement in late winter or spring 2025    Risk and benefits of surgery discussed extensively with the patient.    Surgical risk included but were not limited to infection, wear, loosening, need for further surgery blood clot, failure to heal, failure of the surgery, stiffness, loss of limb  life, extremity function change in length change, and associated risks of  any surgery.

## 2024-11-18 ENCOUNTER — APPOINTMENT (OUTPATIENT)
Dept: ORTHOPEDIC SURGERY | Facility: CLINIC | Age: 64
End: 2024-11-18
Payer: MEDICARE

## 2024-11-18 DIAGNOSIS — M17.12 PRIMARY OSTEOARTHRITIS OF LEFT KNEE: Primary | ICD-10-CM

## 2024-11-18 PROCEDURE — 20610 DRAIN/INJ JOINT/BURSA W/O US: CPT | Performed by: ORTHOPAEDIC SURGERY

## 2024-11-18 PROCEDURE — 1157F ADVNC CARE PLAN IN RCRD: CPT | Performed by: ORTHOPAEDIC SURGERY

## 2024-11-18 NOTE — PROGRESS NOTES
Before the left injection the benefits of a hyaluronic acid  injection including infection, local skin irritation, skin atrophy, calcification, continued pain and discomfort, elevated blood sugar, burning, failure to relieve pain, possible late infection were discussed with the patient.    Postprocedure discomfort can be alleviated with additional medications, ice, elevation, rest over the first 24 hours as recommended.    Patient verbalized understanding and wanted to proceed with the planned procedure.    After informed consent was provided and allergies verified, the patient was positioned appropriately on the bed.  The LEFT KNEE to be injected was prepped and draped in a sterile fashion.  The skin was anesthetized with ethyl chloride spray.   A  22-gauge needle was used to inject the appropriate joint.    Joint injection was performed with Synvisc 1 injection contains 48 mg of hyaluronic acid per 6 mL was performed.  The needle was removed and the puncture site closed and sealed with a Band-Aid.  The patient tolerated the procedure well.    L Inj/Asp: L knee on 11/18/2024 2:44 PM  Indications: pain  Details: 18 G needle, anteromedial approach  Medications: 6 mL hylan 48 mg/6 mL  Outcome: tolerated well, no immediate complications  Procedure, treatment alternatives, risks and benefits explained, specific risks discussed. Consent was given by the patient. Immediately prior to procedure a time out was called to verify the correct patient, procedure, equipment, support staff and site/side marked as required. Patient was prepped and draped in the usual sterile fashion.

## 2024-11-20 PROBLEM — M46.28 SACRAL OSTEOMYELITIS (MULTI): Status: ACTIVE | Noted: 2024-11-20

## 2024-11-20 PROBLEM — E66.812 CLASS 2 SEVERE OBESITY DUE TO EXCESS CALORIES WITH SERIOUS COMORBIDITY IN ADULT, UNSPECIFIED BMI: Status: ACTIVE | Noted: 2022-06-20

## 2024-11-20 PROBLEM — E66.01 CLASS 2 SEVERE OBESITY DUE TO EXCESS CALORIES WITH SERIOUS COMORBIDITY IN ADULT, UNSPECIFIED BMI: Status: ACTIVE | Noted: 2022-06-20

## 2024-12-11 DIAGNOSIS — Z00.00 ROUTINE GENERAL MEDICAL EXAMINATION AT A HEALTH CARE FACILITY: ICD-10-CM

## 2024-12-11 RX ORDER — AMLODIPINE BESYLATE 5 MG/1
10 TABLET ORAL DAILY
Qty: 180 TABLET | Refills: 0 | Status: SHIPPED | OUTPATIENT
Start: 2024-12-11

## 2025-01-17 ENCOUNTER — APPOINTMENT (OUTPATIENT)
Dept: PRIMARY CARE | Facility: CLINIC | Age: 65
End: 2025-01-17
Payer: MEDICARE

## 2025-01-17 VITALS
HEART RATE: 81 BPM | WEIGHT: 267 LBS | DIASTOLIC BLOOD PRESSURE: 78 MMHG | OXYGEN SATURATION: 98 % | BODY MASS INDEX: 38.22 KG/M2 | HEIGHT: 70 IN | SYSTOLIC BLOOD PRESSURE: 128 MMHG

## 2025-01-17 DIAGNOSIS — G89.29 CHRONIC LEFT HIP PAIN: ICD-10-CM

## 2025-01-17 DIAGNOSIS — G89.29 CHRONIC PAIN OF LEFT KNEE: ICD-10-CM

## 2025-01-17 DIAGNOSIS — E66.812 CLASS 2 SEVERE OBESITY DUE TO EXCESS CALORIES WITH SERIOUS COMORBIDITY IN ADULT, UNSPECIFIED BMI: ICD-10-CM

## 2025-01-17 DIAGNOSIS — E06.3 HYPOTHYROIDISM DUE TO HASHIMOTO'S THYROIDITIS: ICD-10-CM

## 2025-01-17 DIAGNOSIS — R26.89 INABILITY TO BEAR WEIGHT: ICD-10-CM

## 2025-01-17 DIAGNOSIS — Z01.818 ENCOUNTER FOR PREADMISSION TESTING: Primary | ICD-10-CM

## 2025-01-17 DIAGNOSIS — M25.562 CHRONIC PAIN OF LEFT KNEE: ICD-10-CM

## 2025-01-17 DIAGNOSIS — E66.01 CLASS 2 SEVERE OBESITY DUE TO EXCESS CALORIES WITH SERIOUS COMORBIDITY IN ADULT, UNSPECIFIED BMI: ICD-10-CM

## 2025-01-17 DIAGNOSIS — M25.462 EFFUSION OF KNEE JOINT, LEFT: ICD-10-CM

## 2025-01-17 DIAGNOSIS — M25.552 CHRONIC LEFT HIP PAIN: ICD-10-CM

## 2025-01-17 DIAGNOSIS — Z00.00 ROUTINE GENERAL MEDICAL EXAMINATION AT A HEALTH CARE FACILITY: ICD-10-CM

## 2025-01-17 PROCEDURE — 3078F DIAST BP <80 MM HG: CPT | Performed by: EMERGENCY MEDICINE

## 2025-01-17 PROCEDURE — 3074F SYST BP LT 130 MM HG: CPT | Performed by: EMERGENCY MEDICINE

## 2025-01-17 PROCEDURE — 1157F ADVNC CARE PLAN IN RCRD: CPT | Performed by: EMERGENCY MEDICINE

## 2025-01-17 PROCEDURE — 1123F ACP DISCUSS/DSCN MKR DOCD: CPT | Performed by: EMERGENCY MEDICINE

## 2025-01-17 PROCEDURE — 1036F TOBACCO NON-USER: CPT | Performed by: EMERGENCY MEDICINE

## 2025-01-17 PROCEDURE — 99213 OFFICE O/P EST LOW 20 MIN: CPT | Performed by: EMERGENCY MEDICINE

## 2025-01-17 PROCEDURE — 3008F BODY MASS INDEX DOCD: CPT | Performed by: EMERGENCY MEDICINE

## 2025-01-17 RX ORDER — AMLODIPINE BESYLATE 5 MG/1
10 TABLET ORAL DAILY
Qty: 180 TABLET | Refills: 1 | Status: SHIPPED | OUTPATIENT
Start: 2025-01-17

## 2025-01-17 RX ORDER — METOPROLOL SUCCINATE 100 MG/1
100 TABLET, EXTENDED RELEASE ORAL DAILY
Qty: 90 TABLET | Refills: 1 | Status: SHIPPED | OUTPATIENT
Start: 2025-01-17

## 2025-01-17 RX ORDER — LEVOTHYROXINE SODIUM 50 UG/1
50 TABLET ORAL DAILY
Qty: 90 TABLET | Refills: 1 | Status: SHIPPED | OUTPATIENT
Start: 2025-01-17

## 2025-01-17 ASSESSMENT — PATIENT HEALTH QUESTIONNAIRE - PHQ9
SUM OF ALL RESPONSES TO PHQ9 QUESTIONS 1 AND 2: 0
2. FEELING DOWN, DEPRESSED OR HOPELESS: NOT AT ALL
1. LITTLE INTEREST OR PLEASURE IN DOING THINGS: NOT AT ALL

## 2025-01-17 NOTE — PROGRESS NOTES
Subjective   Patient ID: Derick Barone is a 64 y.o. male who presents for PAT (Knee replacement: on feb 4th with Dr. Denis Kelly).    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      Hypertension-continue metoprolol and amlodipine     Hypothyroidism-continue Synthroid     Lab work      Preventative care-   Had shingles in past, does not require vaccination  Brother with colon cancer, getting colonoscopies every 5 years.   Grandfather with prostate cancer, will check PSA on next labs.      Follow up in 3 months or sooner as needed    Source of history: Nurse, Medical personnel, Medical record, Patient.  History limitation: None.    HPI  64 y.o. male here for pre-admission testing     30 years ago diagnosed with arthritis.     Covid 1 months ago. Cough since but getting better.     History from past hospitalization (12/2023)-   He was admitted 2x recently for left elbow infection. In first admission was discharge on oral doxy. Returned with worsening pain and redness. Given IV vanco and ID consulted. Blood culture negative. Wound culture positive for MRSA.   Discharged on oral linezolid for 7 days.      Wound is healing well, no current signs of infection.      Brother with colon cancer, grandfather with prostate cancer.       Allergies   Allergen Reactions    Divalproex Other    Valproic Acid Unknown       Current Outpatient Medications   Medication Sig Dispense Refill    amLODIPine (Norvasc) 5 mg tablet TAKE 2 TABLETS ONCE DAILY 180 tablet 0    levothyroxine (Synthroid, Levoxyl) 50 mcg tablet TAKE 1  "TABLET DAILY 90 tablet 1    linezolid (Zyvox) 600 mg tablet Take 1 tablet (600 mg) by mouth 2 times a day. For 7 days      methylPREDNISolone (Medrol Dospak) 4 mg tablets Follow schedule on package instructions 1 each 0    metoprolol succinate XL (Toprol-XL) 100 mg 24 hr tablet Take 1 tablet (100 mg) by mouth once daily. 90 tablet 1     No current facility-administered medications for this visit.       Objective   Visit Vitals  /78   Pulse 81   Ht 1.778 m (5' 10\")   Wt 121 kg (267 lb)   SpO2 98%   BMI 38.31 kg/m²   Smoking Status Former   BSA 2.44 m²     Physical Exam  Vital signs as per nursing/MA documentation   General appearance: Alert and in no acute distress  HEENT: Normal Inspection   Neck: Normal Inspection   Respiratory: No respiratory distress Lungs are clear   Cardiovascular: Heart rate normal. No gallop  Back: Normal Inspection   Skin inspection: Warm   Musculoskeletal: No deformities   Neuro: Limited exam. Baseline    Review of Systems  Comprehensive review of systems as allowed by patient condition and nursing input is negative    No visits with results within 4 Month(s) from this visit.   Latest known visit with results is:   Lab on 01/24/2024   Component Date Value Ref Range Status    WBC 01/24/2024 7.2  4.4 - 11.3 x10*3/uL Final    nRBC 01/24/2024 0.0  0.0 - 0.0 /100 WBCs Final    RBC 01/24/2024 5.01  4.50 - 5.90 x10*6/uL Final    Hemoglobin 01/24/2024 13.8  13.5 - 17.5 g/dL Final    Hematocrit 01/24/2024 43.4  41.0 - 52.0 % Final    MCV 01/24/2024 87  80 - 100 fL Final    MCH 01/24/2024 27.5  26.0 - 34.0 pg Final    MCHC 01/24/2024 31.8 (L)  32.0 - 36.0 g/dL Final    RDW 01/24/2024 14.7 (H)  11.5 - 14.5 % Final    Platelets 01/24/2024 200  150 - 450 x10*3/uL Final    Neutrophils % 01/24/2024 68.2  40.0 - 80.0 % Final    Immature Granulocytes %, Automated 01/24/2024 0.6  0.0 - 0.9 % Final    Lymphocytes % 01/24/2024 21.2  13.0 - 44.0 % Final    Monocytes % 01/24/2024 6.6  2.0 - 10.0 % Final    " Eosinophils % 01/24/2024 2.8  0.0 - 6.0 % Final    Basophils % 01/24/2024 0.6  0.0 - 2.0 % Final    Neutrophils Absolute 01/24/2024 4.93  1.20 - 7.70 x10*3/uL Final    Immature Granulocytes Absolute, Au* 01/24/2024 0.04  0.00 - 0.70 x10*3/uL Final    Lymphocytes Absolute 01/24/2024 1.53  1.20 - 4.80 x10*3/uL Final    Monocytes Absolute 01/24/2024 0.48  0.10 - 1.00 x10*3/uL Final    Eosinophils Absolute 01/24/2024 0.20  0.00 - 0.70 x10*3/uL Final    Basophils Absolute 01/24/2024 0.04  0.00 - 0.10 x10*3/uL Final    Glucose 01/24/2024 152 (H)  74 - 99 mg/dL Final    Sodium 01/24/2024 143  136 - 145 mmol/L Final    Potassium 01/24/2024 4.3  3.5 - 5.3 mmol/L Final    Chloride 01/24/2024 106  98 - 107 mmol/L Final    Bicarbonate 01/24/2024 26  21 - 32 mmol/L Final    Anion Gap 01/24/2024 15  10 - 20 mmol/L Final    Urea Nitrogen 01/24/2024 20  6 - 23 mg/dL Final    Creatinine 01/24/2024 1.01  0.50 - 1.30 mg/dL Final    eGFR 01/24/2024 83  >60 mL/min/1.73m*2 Final    Calcium 01/24/2024 9.5  8.6 - 10.6 mg/dL Final    Albumin 01/24/2024 4.2  3.4 - 5.0 g/dL Final    Alkaline Phosphatase 01/24/2024 115  33 - 136 U/L Final    Total Protein 01/24/2024 7.2  6.4 - 8.2 g/dL Final    AST 01/24/2024 25  9 - 39 U/L Final    Bilirubin, Total 01/24/2024 0.4  0.0 - 1.2 mg/dL Final    ALT 01/24/2024 25  10 - 52 U/L Final    Thyroid Stimulating Hormone 01/24/2024 1.52  0.44 - 3.98 mIU/L Final       Radiology: Reviewed imaging in powerchart.  No results found.    Family History   Problem Relation Name Age of Onset    Diabetes Mother      Coronary artery disease Father      Leukemia Sister      Diabetes Sister      Epilepsy Daughter      Seizures Daughter      Colon cancer Mother's Brother      Pancreatic cancer Mother's Brother      Throat cancer Mother's Brother      Coronary artery disease Mother's Brother       Social History     Socioeconomic History    Marital status:    Tobacco Use    Smoking status: Former     Types:  Cigarettes    Smokeless tobacco: Never   Vaping Use    Vaping status: Never Used   Substance and Sexual Activity    Alcohol use: Never    Drug use: Never    Sexual activity: Not Currently     Past Medical History:   Diagnosis Date    Acute ethmoidal sinusitis, unspecified     Acute ethmoidal sinusitis     Past Surgical History:   Procedure Laterality Date    OTHER SURGICAL HISTORY  03/20/2019    Inguinal hernia repair    OTHER SURGICAL HISTORY  12/21/2020    Knee replacement

## 2025-01-22 ENCOUNTER — LAB (OUTPATIENT)
Dept: LAB | Facility: LAB | Age: 65
End: 2025-01-22
Payer: MEDICARE

## 2025-01-22 ENCOUNTER — APPOINTMENT (OUTPATIENT)
Dept: CARDIOLOGY | Facility: CLINIC | Age: 65
End: 2025-01-22
Payer: MEDICARE

## 2025-01-22 VITALS — HEART RATE: 70 BPM

## 2025-01-22 DIAGNOSIS — Z01.818 PRE-OP TESTING: ICD-10-CM

## 2025-01-22 LAB
ALBUMIN SERPL BCP-MCNC: 4.2 G/DL (ref 3.4–5)
ALP SERPL-CCNC: 106 U/L (ref 33–136)
ALT SERPL W P-5'-P-CCNC: 28 U/L (ref 10–52)
ANION GAP SERPL CALC-SCNC: 10 MMOL/L (ref 10–20)
AST SERPL W P-5'-P-CCNC: 26 U/L (ref 9–39)
BASOPHILS # BLD AUTO: 0.06 X10*3/UL (ref 0–0.1)
BASOPHILS NFR BLD AUTO: 0.6 %
BILIRUB SERPL-MCNC: 0.5 MG/DL (ref 0–1.2)
BUN SERPL-MCNC: 16 MG/DL (ref 6–23)
CALCIUM SERPL-MCNC: 9.6 MG/DL (ref 8.6–10.3)
CHLORIDE SERPL-SCNC: 107 MMOL/L (ref 98–107)
CO2 SERPL-SCNC: 28 MMOL/L (ref 21–32)
CREAT SERPL-MCNC: 1.08 MG/DL (ref 0.5–1.3)
EGFRCR SERPLBLD CKD-EPI 2021: 76 ML/MIN/1.73M*2
EOSINOPHIL # BLD AUTO: 0.09 X10*3/UL (ref 0–0.7)
EOSINOPHIL NFR BLD AUTO: 1 %
ERYTHROCYTE [DISTWIDTH] IN BLOOD BY AUTOMATED COUNT: 13.3 % (ref 11.5–14.5)
EST. AVERAGE GLUCOSE BLD GHB EST-MCNC: 111 MG/DL
GLUCOSE SERPL-MCNC: 117 MG/DL (ref 74–99)
HBA1C MFR BLD: 5.5 %
HCT VFR BLD AUTO: 43.7 % (ref 41–52)
HGB BLD-MCNC: 14.3 G/DL (ref 13.5–17.5)
IMM GRANULOCYTES # BLD AUTO: 0.04 X10*3/UL (ref 0–0.7)
IMM GRANULOCYTES NFR BLD AUTO: 0.4 % (ref 0–0.9)
INR PPP: 1 (ref 0.9–1.1)
LYMPHOCYTES # BLD AUTO: 1.79 X10*3/UL (ref 1.2–4.8)
LYMPHOCYTES NFR BLD AUTO: 19.4 %
MCH RBC QN AUTO: 28 PG (ref 26–34)
MCHC RBC AUTO-ENTMCNC: 32.7 G/DL (ref 32–36)
MCV RBC AUTO: 86 FL (ref 80–100)
MONOCYTES # BLD AUTO: 0.6 X10*3/UL (ref 0.1–1)
MONOCYTES NFR BLD AUTO: 6.5 %
NEUTROPHILS # BLD AUTO: 6.67 X10*3/UL (ref 1.2–7.7)
NEUTROPHILS NFR BLD AUTO: 72.1 %
NRBC BLD-RTO: 0 /100 WBCS (ref 0–0)
PLATELET # BLD AUTO: 206 X10*3/UL (ref 150–450)
POTASSIUM SERPL-SCNC: 5.3 MMOL/L (ref 3.5–5.3)
PROT SERPL-MCNC: 7 G/DL (ref 6.4–8.2)
PROTHROMBIN TIME: 10.9 SECONDS (ref 9.8–12.8)
RBC # BLD AUTO: 5.1 X10*6/UL (ref 4.5–5.9)
SODIUM SERPL-SCNC: 140 MMOL/L (ref 136–145)
WBC # BLD AUTO: 9.3 X10*3/UL (ref 4.4–11.3)

## 2025-01-22 PROCEDURE — 85025 COMPLETE CBC W/AUTO DIFF WBC: CPT

## 2025-01-22 PROCEDURE — 93000 ELECTROCARDIOGRAM COMPLETE: CPT | Performed by: INTERNAL MEDICINE

## 2025-01-22 PROCEDURE — 83036 HEMOGLOBIN GLYCOSYLATED A1C: CPT

## 2025-01-22 PROCEDURE — 80053 COMPREHEN METABOLIC PANEL: CPT

## 2025-01-22 PROCEDURE — 85610 PROTHROMBIN TIME: CPT

## 2025-01-22 NOTE — PROGRESS NOTES
PATIENT PRESENTED TO THE OFFICE FOR A EGK ORDERED BY DR. PRIETO RODRIGUEZ DUE TO  PRE OP TESTING. EKG READ IN OFFICE BY  DR. BARBARA DIOP.  NO ORDERS GIVEN.

## 2025-01-27 ENCOUNTER — APPOINTMENT (OUTPATIENT)
Dept: ORTHOPEDIC SURGERY | Facility: CLINIC | Age: 65
End: 2025-01-27
Payer: MEDICARE

## 2025-01-27 DIAGNOSIS — G89.18 ACUTE POSTOPERATIVE PAIN: ICD-10-CM

## 2025-01-27 DIAGNOSIS — M16.11 OSTEOARTHRITIS OF RIGHT HIP, UNSPECIFIED OSTEOARTHRITIS TYPE: ICD-10-CM

## 2025-01-27 DIAGNOSIS — Z96.652 STATUS POST TOTAL KNEE REPLACEMENT, LEFT: Primary | ICD-10-CM

## 2025-01-27 PROCEDURE — 1123F ACP DISCUSS/DSCN MKR DOCD: CPT | Performed by: PHYSICIAN ASSISTANT

## 2025-01-27 PROCEDURE — 1157F ADVNC CARE PLAN IN RCRD: CPT | Performed by: PHYSICIAN ASSISTANT

## 2025-01-27 RX ORDER — DOCUSATE SODIUM 100 MG/1
100 CAPSULE, LIQUID FILLED ORAL 2 TIMES DAILY PRN
Qty: 60 CAPSULE | Refills: 0 | Status: SHIPPED | OUTPATIENT
Start: 2025-01-27

## 2025-01-27 RX ORDER — CHLORHEXIDINE GLUCONATE ORAL RINSE 1.2 MG/ML
SOLUTION DENTAL
Qty: 30 ML | Refills: 0 | Status: SHIPPED | OUTPATIENT
Start: 2025-01-27

## 2025-01-27 RX ORDER — OXYCODONE HYDROCHLORIDE 5 MG/1
5 TABLET ORAL EVERY 6 HOURS PRN
Qty: 28 TABLET | Refills: 0 | Status: SHIPPED | OUTPATIENT
Start: 2025-01-27 | End: 2025-02-03

## 2025-01-27 RX ORDER — CYCLOBENZAPRINE HCL 10 MG
10 TABLET ORAL 3 TIMES DAILY PRN
Qty: 21 TABLET | Refills: 0 | Status: SHIPPED | OUTPATIENT
Start: 2025-01-27 | End: 2025-02-03

## 2025-01-27 RX ORDER — ASPIRIN 81 MG/1
81 TABLET ORAL 2 TIMES DAILY
Qty: 60 TABLET | Refills: 0 | Status: SHIPPED | OUTPATIENT
Start: 2025-01-27 | End: 2025-02-26

## 2025-01-27 RX ORDER — ACETAMINOPHEN 325 MG/1
650 TABLET ORAL EVERY 6 HOURS PRN
Qty: 42 TABLET | Refills: 0 | Status: SHIPPED | OUTPATIENT
Start: 2025-01-27

## 2025-01-27 RX ORDER — CHLORHEXIDINE GLUCONATE 40 MG/ML
SOLUTION TOPICAL
Qty: 473 ML | Refills: 0 | Status: SHIPPED | OUTPATIENT
Start: 2025-01-27

## 2025-01-27 RX ORDER — ONDANSETRON 4 MG/1
4 TABLET, FILM COATED ORAL EVERY 8 HOURS PRN
Qty: 20 TABLET | Refills: 0 | Status: SHIPPED | OUTPATIENT
Start: 2025-01-27 | End: 2025-02-03

## 2025-01-27 NOTE — PROGRESS NOTES
History and Physical      CHIEF COMPLAINT: Left knee pain    HISTORY OF PRESENT ILLNESS:      The patient is a64 y.o. male with significant past medical history of left knee pain due to osteoarthritis.  Conservative therapy is no longer providing relief.  After risk benefits alternatives were discussed patient likes to proceed with left total knee replacement.      Past Medical History:  Past Medical History:   Diagnosis Date    Acute ethmoidal sinusitis, unspecified     Acute ethmoidal sinusitis        Past Surgical History:    Past Surgical History:   Procedure Laterality Date    OTHER SURGICAL HISTORY  03/20/2019    Inguinal hernia repair    OTHER SURGICAL HISTORY  12/21/2020    Knee replacement       Medications Prior to Admission:    Current Outpatient Medications on File Prior to Visit   Medication Sig Dispense Refill    amLODIPine (Norvasc) 5 mg tablet Take 2 tablets (10 mg) by mouth once daily. 180 tablet 1    levothyroxine (Synthroid, Levoxyl) 50 mcg tablet Take 1 tablet (50 mcg) by mouth once daily. 90 tablet 1    methylPREDNISolone (Medrol Dospak) 4 mg tablets Follow schedule on package instructions 1 each 0    metoprolol succinate XL (Toprol-XL) 100 mg 24 hr tablet Take 1 tablet (100 mg) by mouth once daily. 90 tablet 1     No current facility-administered medications on file prior to visit.        Allergies:  Divalproex and Valproic acid    Social History:   Social History     Socioeconomic History    Marital status:      Spouse name: Not on file    Number of children: Not on file    Years of education: Not on file    Highest education level: Not on file   Occupational History    Not on file   Tobacco Use    Smoking status: Former     Types: Cigarettes    Smokeless tobacco: Never   Vaping Use    Vaping status: Never Used   Substance and Sexual Activity    Alcohol use: Never    Drug use: Never    Sexual activity: Not Currently   Other Topics Concern    Not on file   Social History Narrative     Not on file     Social Drivers of Health     Financial Resource Strain: Not on file   Food Insecurity: Not on file   Transportation Needs: Not on file   Physical Activity: Not on file   Stress: Not on file   Social Connections: Not on file   Intimate Partner Violence: Not on file   Housing Stability: Not on file       Family History:   Family History   Problem Relation Name Age of Onset    Diabetes Mother      Coronary artery disease Father      Leukemia Sister      Diabetes Sister      Epilepsy Daughter      Seizures Daughter      Colon cancer Mother's Brother      Pancreatic cancer Mother's Brother      Throat cancer Mother's Brother      Coronary artery disease Mother's Brother          Review of systems: Noncontributory for orthopedics    Vitals:  There were no vitals taken for this visit.    Physical examination:  Head normocephalic atraumatic  Heart regular rate and rhythm  Lungs clear  Abdominal exam nontender nondistended  Extremity: Left knee varus deformity medial joint line tenderness current range of motion is 0 to 130 degrees    Impression : Left knee degenerative joint disease      Plan:  Scheduled for left total knee replacement    Risk and benefits of surgery discussed extensively with the patient.    Surgical risk included but were not limited to infection, wear, loosening, need for further surgery blood clot, failure to heal, failure of the surgery, stiffness, loss of limb life, extremity function change in length change, and associated risks of surgery during the coronavirus epidemic.    Risk with any surgery including arthroplasty and replacements include but are not limited to:       Wear, loosening, infection, blood clot, DVT, loss of limb, life, delayed recovery, limb length change, instability, dislocation, discomfort with new implant and failure of the procedure.

## 2025-02-04 ENCOUNTER — OUTSIDE PROCEDURE (OUTPATIENT)
Dept: ORTHOPEDIC SURGERY | Facility: CLINIC | Age: 65
End: 2025-02-04
Payer: MEDICARE

## 2025-02-04 PROCEDURE — 27447 TOTAL KNEE ARTHROPLASTY: CPT | Performed by: ORTHOPAEDIC SURGERY

## 2025-02-04 PROCEDURE — 27447 TOTAL KNEE ARTHROPLASTY: CPT | Performed by: PHYSICIAN ASSISTANT

## 2025-02-04 NOTE — PROGRESS NOTES
Preop diagnosis: Left knee osteoarthritis    Postop diagnosis: Left knee osteoarthritis    Procedure: Left total knee replacement    Surgeon: Denis Calderon M.D.    Assistant: Denis Singh physician assistant (PAC) was required and present throughout the entire case.  Given the nature of the disease process and the procedure, a skilled surgical first assistant was necessary during the entire case.  The assistant was necessary to hold retractors and manipulate extremity during the procedure.  A certified scrub tech was also present at the back table managing instruments with supplies for the surgical case      Anesthesia: Spinal with regional block      Blood loss: Less than 10 cc    Fluids: Less than 2 L       Indications: Left knee severe pain failing conservative treatment now for knee replacement due to severe osteoarthritis and deformity      Summation of event:    Operative Report:       Procedure left total knee replacement    Components size:    Femoral size cemented Gigi persona 7     Tibial size cemented Gigi persona F    Patella size cemented 35 mm    Aura size 14 mm PS    Alignment varus    Summation of surgical procedure    The patient was taken to the operating room and surgical timeout performed.  The patient received preoperative antibiotics.  After satisfactory anesthetic the appropriate knee was prepped and draped in the usual sterile fashion.  A medial prepatellar incision was made and dissection carried sharply through the skin and underlying fat to the fascial tissue.  A medial capsular incision was made and the joint entered.  There was a modest amount of synovial fluid.  Synovium was hypertrophic.    A drill hole was made in the center of the femur and the intramedullary guide inserted.  The cutting jig was inserted with 3° of external rotation and the distal femoral cut was made.  The jig the rods were removed and the appropriate size template inserted.  The  anterior and posterior condyles were cut with an oscillating saw, and the chamfering cuts were made.  Final preparation of the femur was completed and this allowed for translating the tibia anteriorly.    A drill hole was made in the proximal tibia just in line with the old ACL footprint.  An intramedullary guide was used in the tibia and a cut was made 2 mm below the worn tibial surface after guidepins were placed and the tibial cutting guide was fitted on the anterior tibia with approximately 3° of slope posteriorly.    After the tibia was cut appropriate sizing of the flexion-extension gaps was measured Intra-Op, adjustments were made as needed to correct the gaps so that they were symmetric in both flexion and extension.  We then trialed the appropriate size tibia on the proximal tibia and finish tibial preparation first with a punch after insertion of the central reamer.    Trial femur and tibial components were inserted and we selected and trialed the appropriate poly iner.  This was used to control stability which was assessed in both flexion extension and the varus valgus plane.    With the knee in full extension the patella preparation was completed we reamed the patella with the appropriate size reamer to leave a residual 14-15 mm of natural patella.  Final patella preparation included 3 drill holes and patellar tracking was assessed.    Trials were inserted, appropriate sizes were determined and ligament balance was performed.  A batch of cement was mixed after lavage and careful bone drying, procedures were inserted and held in full extension until the cement had hardened with a patella clamp in place.  The ligaments were checked and stable.  The wound was then closed in layers using #1 Vicryl to close the arthrotomy and a figure-of-eight manner, inverted interrupted 2-0 Vicryl the subcutaneous tissues, a running 3-0 Monocryl in the subcutaneous and skin layers compressive dressing was then applied and a  bulky sterile fashion.  The patient tolerated the procedure well and returned to the post anesthesia recovery room in satisfactory condition.  Postop  operative x-ray was reviewed and the findings

## 2025-02-13 DIAGNOSIS — M25.562 ACUTE PAIN OF LEFT KNEE: ICD-10-CM

## 2025-02-13 DIAGNOSIS — Z96.652 STATUS POST TOTAL KNEE REPLACEMENT, LEFT: Primary | ICD-10-CM

## 2025-02-13 RX ORDER — OXYCODONE AND ACETAMINOPHEN 5; 325 MG/1; MG/1
1 TABLET ORAL EVERY 6 HOURS PRN
Qty: 28 TABLET | Refills: 0 | Status: SHIPPED | OUTPATIENT
Start: 2025-02-13 | End: 2025-02-20

## 2025-02-17 DIAGNOSIS — E06.3 HYPOTHYROIDISM DUE TO HASHIMOTO'S THYROIDITIS: ICD-10-CM

## 2025-02-19 RX ORDER — LEVOTHYROXINE SODIUM 50 UG/1
50 TABLET ORAL DAILY
Qty: 90 TABLET | Refills: 3 | Status: SHIPPED | OUTPATIENT
Start: 2025-02-19

## 2025-02-24 ENCOUNTER — HOSPITAL ENCOUNTER (OUTPATIENT)
Dept: RADIOLOGY | Facility: HOSPITAL | Age: 65
Discharge: HOME | End: 2025-02-24
Payer: MEDICARE

## 2025-02-24 ENCOUNTER — APPOINTMENT (OUTPATIENT)
Dept: ORTHOPEDIC SURGERY | Facility: CLINIC | Age: 65
End: 2025-02-24
Payer: MEDICARE

## 2025-02-24 DIAGNOSIS — M17.12 PRIMARY OSTEOARTHRITIS OF LEFT KNEE: ICD-10-CM

## 2025-02-24 PROCEDURE — 73560 X-RAY EXAM OF KNEE 1 OR 2: CPT | Mod: LT

## 2025-02-24 PROCEDURE — 1123F ACP DISCUSS/DSCN MKR DOCD: CPT | Performed by: PHYSICIAN ASSISTANT

## 2025-02-24 PROCEDURE — 99024 POSTOP FOLLOW-UP VISIT: CPT | Performed by: PHYSICIAN ASSISTANT

## 2025-02-24 PROCEDURE — 1157F ADVNC CARE PLAN IN RCRD: CPT | Performed by: PHYSICIAN ASSISTANT

## 2025-02-24 PROCEDURE — 73560 X-RAY EXAM OF KNEE 1 OR 2: CPT | Mod: LEFT SIDE | Performed by: RADIOLOGY

## 2025-02-24 NOTE — PROGRESS NOTES
History of present illness patient is 2-week status post left total knee replacement.  Overall he is doing well.  He is ambulating without walker.      Physical exam:      General: No acute distress or breathing difficulty or discomfort, pleasant and cooperative with the examination.    Extremities: Left knee incisions clean dry and intact.  He has 2+ effusion but no drainage or evidence of infection diffuse ecchymosis which appears to be resolving.  He can perform a good straight leg raise current range of motion is 4 degrees to 80 degrees      Diagnostic studies: X-rays 2 views left knee show alignment position of total knee replacement of fracture dislocation on 2 views taken today    Impression: Status post left total knee replacement    Plan: Patient will continue physical therapy and switch over to outpatient.  Order was provided today.  He will follow-up in 4 weeks with x-rays 2 views left knee and range of motion check.

## 2025-02-26 DIAGNOSIS — Z96.652 STATUS POST TOTAL KNEE REPLACEMENT, LEFT: ICD-10-CM

## 2025-02-26 DIAGNOSIS — M17.12 PRIMARY OSTEOARTHRITIS OF LEFT KNEE: ICD-10-CM

## 2025-02-26 RX ORDER — OXYCODONE HYDROCHLORIDE 5 MG/1
5 TABLET ORAL EVERY 6 HOURS PRN
Qty: 28 TABLET | Refills: 0 | Status: SHIPPED | OUTPATIENT
Start: 2025-02-26 | End: 2025-03-05

## 2025-03-12 ENCOUNTER — ANCILLARY PROCEDURE (OUTPATIENT)
Dept: ORTHOPEDIC SURGERY | Facility: CLINIC | Age: 65
End: 2025-03-12
Payer: MEDICARE

## 2025-03-12 ENCOUNTER — OFFICE VISIT (OUTPATIENT)
Dept: ORTHOPEDIC SURGERY | Facility: CLINIC | Age: 65
End: 2025-03-12
Payer: MEDICARE

## 2025-03-12 DIAGNOSIS — M25.572 ACUTE LEFT ANKLE PAIN: ICD-10-CM

## 2025-03-12 DIAGNOSIS — S93.409A SPRAIN OF ANKLE, INITIAL ENCOUNTER: ICD-10-CM

## 2025-03-12 PROCEDURE — 1157F ADVNC CARE PLAN IN RCRD: CPT | Performed by: ORTHOPAEDIC SURGERY

## 2025-03-12 PROCEDURE — 99214 OFFICE O/P EST MOD 30 MIN: CPT | Performed by: ORTHOPAEDIC SURGERY

## 2025-03-12 PROCEDURE — 1123F ACP DISCUSS/DSCN MKR DOCD: CPT | Performed by: ORTHOPAEDIC SURGERY

## 2025-03-12 PROCEDURE — L1902 AFO ANKLE GAUNTLET PRE OTS: HCPCS | Performed by: ORTHOPAEDIC SURGERY

## 2025-03-12 PROCEDURE — 73610 X-RAY EXAM OF ANKLE: CPT | Mod: LEFT SIDE | Performed by: ORTHOPAEDIC SURGERY

## 2025-03-12 RX ORDER — PREDNISONE 10 MG/1
TABLET ORAL
Qty: 30 TABLET | Refills: 0 | Status: SHIPPED | OUTPATIENT
Start: 2025-03-12

## 2025-03-12 NOTE — PROGRESS NOTES
History of present illness: New problem left ankle    He is about 6 weeks out from total knee doing great his ankle has a known previous trauma or old injury with osteophyte spur formation he has anterior lateral impingement of the left ankle probably aggravated from all the therapy and rehab he has been doing on his knee    Physical exam:    General: No acute distress or breathing difficulty or discomfort, pleasant and cooperative with the examination.    Extremities: The affected anterior lateral left ankle ankle was examined and inspected and was tender to the touch along the anterior lateral aspect of the joint into the foot and ankle.  There was pain and mechanical symptoms of instability.  Skin was intact without breakdown or open wound.  There was a positive apprehension sign with stressing over the joint.  Along with evidence of instability in the collateral ligaments.  There is also pain over the midfoot.  With mild flattening over the medial navicular.  There was a negative Barrios sign and the Achilles was intact.  Posterior tib although intact was inflamed and possibly insufficient.  There was an intact anterior tib intact Achilles no foot drop numbness or tingling.    Sensation and reflexes in the foot were well preserved.  There was an effusion over the anterior lateral ankle.  Range of motion showed plantarflexion to 5 degrees and dorsiflexion to 5 degrees.    The patient had limited ability to bear weight due to discomfort and pain.  The patient gait was antalgic secondary to discomfort    Diagnostic studies: X-rays 3 view show osteophyte spur formation over the anterior lateral ankle joint from old trauma traumatic changes noted    Impression: Left ankle posttraumatic arthritic change anterolateral ankle joint    Plan: Brace rest ice range of motion program    Swede-O support brace for comfort prednisone Dosepak for inflammation I will see him back as scheduled on 9 April 2025 for 2 views of his  knee if his ankle continues to give him pain a CT scan will be ordered to see if there is an osteophyte or loose body in the anterolateral gutter

## 2025-04-09 ENCOUNTER — HOSPITAL ENCOUNTER (OUTPATIENT)
Dept: RADIOLOGY | Facility: CLINIC | Age: 65
Discharge: HOME | End: 2025-04-09
Payer: MEDICARE

## 2025-04-09 ENCOUNTER — OFFICE VISIT (OUTPATIENT)
Dept: ORTHOPEDIC SURGERY | Facility: CLINIC | Age: 65
End: 2025-04-09
Payer: MEDICARE

## 2025-04-09 DIAGNOSIS — Z96.652 STATUS POST TOTAL KNEE REPLACEMENT, LEFT: ICD-10-CM

## 2025-04-09 PROCEDURE — 99211 OFF/OP EST MAY X REQ PHY/QHP: CPT | Performed by: ORTHOPAEDIC SURGERY

## 2025-04-09 PROCEDURE — 99024 POSTOP FOLLOW-UP VISIT: CPT | Performed by: ORTHOPAEDIC SURGERY

## 2025-04-09 PROCEDURE — 1123F ACP DISCUSS/DSCN MKR DOCD: CPT | Performed by: ORTHOPAEDIC SURGERY

## 2025-04-09 PROCEDURE — 1157F ADVNC CARE PLAN IN RCRD: CPT | Performed by: ORTHOPAEDIC SURGERY

## 2025-04-09 PROCEDURE — 73560 X-RAY EXAM OF KNEE 1 OR 2: CPT | Mod: LT

## 2025-04-09 PROCEDURE — 73560 X-RAY EXAM OF KNEE 1 OR 2: CPT | Mod: LEFT SIDE | Performed by: ORTHOPAEDIC SURGERY

## 2025-04-09 NOTE — PROGRESS NOTES
History of present illness: History left total knee doing well 2 months out    Physical exam:    General: No acute distress or breathing difficulty or discomfort, pleasant and cooperative with the examination.    Extremities: Knee incision clean and dry    Good straight leg raise    Full extension    Flexion 135    No instability at mid flexion in either side-to-side or anterior posterior drawer plane    She can straight leg raise extensor maxim intact    She can plantarflex/ dorsiflex toes foot and ankle otherwise doing well gets a little patellofemoral crepitus or irritation      Incision remains well-healed ecchymosis bruising swelling edema is all well-controlled and is dissipated there is no calf swelling.  Quad tendon control is excellent.    Diagnostic studies: X-rays show well-positioned total knee left side    Impression: Left total knee doing well    Plan: Follow-up 1 year out for routine checkup left total knee with x-rays February 2026

## 2025-07-03 DIAGNOSIS — E06.3 HYPOTHYROIDISM DUE TO HASHIMOTO'S THYROIDITIS: ICD-10-CM

## 2025-07-03 DIAGNOSIS — Z00.00 ROUTINE GENERAL MEDICAL EXAMINATION AT A HEALTH CARE FACILITY: ICD-10-CM

## 2025-07-03 RX ORDER — LEVOTHYROXINE SODIUM 50 UG/1
50 TABLET ORAL DAILY
Qty: 90 TABLET | Refills: 3 | Status: SHIPPED | OUTPATIENT
Start: 2025-07-03

## 2025-07-03 RX ORDER — AMLODIPINE BESYLATE 5 MG/1
10 TABLET ORAL DAILY
Qty: 180 TABLET | Refills: 3 | Status: SHIPPED | OUTPATIENT
Start: 2025-07-03

## 2025-08-19 ENCOUNTER — TELEPHONE (OUTPATIENT)
Dept: PRIMARY CARE | Facility: CLINIC | Age: 65
End: 2025-08-19
Payer: COMMERCIAL

## (undated) DEVICE — 1010 S-DRAPE TOWEL DRAPE 10/BX: Brand: STERI-DRAPE™

## (undated) DEVICE — LABEL MED MINI W/ MARKER

## (undated) DEVICE — SPONGE GZ W4XL4IN COT 12 PLY TYP VII WVN C FLD DSGN

## (undated) DEVICE — GOWN,AURORA,NONREINFORCED,LARGE: Brand: MEDLINE

## (undated) DEVICE — SUTURE ETHLN SZ 3-0 L18IN NONABSORBABLE BLK PS-2 L19MM 3/8 1669H

## (undated) DEVICE — DRESSING PETRO W3XL8IN OIL EMUL N ADH GZ KNIT IMPREG CELOS

## (undated) DEVICE — WRAP COHESIVE W2INXL5YD TAN SELF ADH BNDG HND NON STERILE TEAR CARING

## (undated) DEVICE — TUBING, SUCTION, 3/16" X 12', STRAIGHT: Brand: MEDLINE

## (undated) DEVICE — ZIMMER® STERILE DISPOSABLE TOURNIQUET CUFF, DUAL PORT, SINGLE BLADDER, 18 IN. (46 CM)

## (undated) DEVICE — GLOVE ORTHO 7 1/2   MSG9475

## (undated) DEVICE — SET,IRRIGATION,CYSTO/TUR: Brand: MEDLINE

## (undated) DEVICE — RING TRNQT UNIV DGT TOURNI COT

## (undated) DEVICE — SINGLE PORT MANIFOLD: Brand: NEPTUNE 2

## (undated) DEVICE — GLOVE ORANGE PI 7   MSG9070

## (undated) DEVICE — APPLICATOR MEDICATED 26 CC SOLUTION HI LT ORNG CHLORAPREP

## (undated) DEVICE — GLOVE ORANGE PI 7 1/2   MSG9075

## (undated) DEVICE — HAND II: Brand: MEDLINE INDUSTRIES, INC.